# Patient Record
Sex: FEMALE | Race: BLACK OR AFRICAN AMERICAN | Employment: OTHER | URBAN - METROPOLITAN AREA
[De-identification: names, ages, dates, MRNs, and addresses within clinical notes are randomized per-mention and may not be internally consistent; named-entity substitution may affect disease eponyms.]

---

## 2017-01-11 ENCOUNTER — ANESTHESIA EVENT (OUTPATIENT)
Dept: ENDOSCOPY | Age: 72
End: 2017-01-11
Payer: MEDICARE

## 2017-01-11 RX ORDER — METFORMIN HYDROCHLORIDE 500 MG/1
TABLET ORAL 2 TIMES DAILY WITH MEALS
COMMUNITY

## 2017-01-11 RX ORDER — MELOXICAM 15 MG/1
15 TABLET ORAL AS NEEDED
COMMUNITY

## 2017-01-11 RX ORDER — ATORVASTATIN CALCIUM 40 MG/1
40 TABLET, FILM COATED ORAL
COMMUNITY

## 2017-01-11 RX ORDER — LANOLIN ALCOHOL/MO/W.PET/CERES
CREAM (GRAM) TOPICAL
COMMUNITY

## 2017-01-11 RX ORDER — MINERAL OIL
ENEMA (ML) RECTAL
COMMUNITY

## 2017-01-11 RX ORDER — LOSARTAN POTASSIUM 50 MG/1
50 TABLET ORAL DAILY
COMMUNITY

## 2017-01-11 RX ORDER — NIFEDIPINE 60 MG/1
60 TABLET, EXTENDED RELEASE ORAL DAILY
COMMUNITY

## 2017-01-12 ENCOUNTER — HOSPITAL ENCOUNTER (OUTPATIENT)
Age: 72
Setting detail: OUTPATIENT SURGERY
Discharge: HOME | End: 2017-01-12
Attending: INTERNAL MEDICINE | Admitting: INTERNAL MEDICINE
Payer: MEDICARE

## 2017-01-12 ENCOUNTER — ANESTHESIA (OUTPATIENT)
Dept: ENDOSCOPY | Age: 72
End: 2017-01-12
Payer: MEDICARE

## 2017-01-12 VITALS
HEIGHT: 65 IN | HEART RATE: 73 BPM | TEMPERATURE: 96.5 F | RESPIRATION RATE: 18 BRPM | DIASTOLIC BLOOD PRESSURE: 68 MMHG | SYSTOLIC BLOOD PRESSURE: 145 MMHG | BODY MASS INDEX: 28.99 KG/M2 | WEIGHT: 174 LBS | OXYGEN SATURATION: 100 %

## 2017-01-12 LAB — GLUCOSE BLD STRIP.AUTO-MCNC: 112 MG/DL (ref 70–110)

## 2017-01-12 PROCEDURE — 74011250636 HC RX REV CODE- 250/636: Performed by: NURSE ANESTHETIST, CERTIFIED REGISTERED

## 2017-01-12 PROCEDURE — 76040000007: Performed by: INTERNAL MEDICINE

## 2017-01-12 PROCEDURE — 74011000250 HC RX REV CODE- 250: Performed by: NURSE ANESTHETIST, CERTIFIED REGISTERED

## 2017-01-12 PROCEDURE — 82962 GLUCOSE BLOOD TEST: CPT

## 2017-01-12 PROCEDURE — 76060000031 HC ANESTHESIA FIRST 0.5 HR: Performed by: INTERNAL MEDICINE

## 2017-01-12 PROCEDURE — 74011250636 HC RX REV CODE- 250/636

## 2017-01-12 PROCEDURE — 74011000250 HC RX REV CODE- 250

## 2017-01-12 RX ORDER — DEXTROSE 50 % IN WATER (D50W) INTRAVENOUS SYRINGE
25-50 AS NEEDED
Status: DISCONTINUED | OUTPATIENT
Start: 2017-01-12 | End: 2017-01-12 | Stop reason: HOSPADM

## 2017-01-12 RX ORDER — PROPOFOL 10 MG/ML
INJECTION, EMULSION INTRAVENOUS AS NEEDED
Status: DISCONTINUED | OUTPATIENT
Start: 2017-01-12 | End: 2017-01-12 | Stop reason: HOSPADM

## 2017-01-12 RX ORDER — INSULIN LISPRO 100 [IU]/ML
INJECTION, SOLUTION INTRAVENOUS; SUBCUTANEOUS ONCE
Status: DISCONTINUED | OUTPATIENT
Start: 2017-01-12 | End: 2017-01-12 | Stop reason: HOSPADM

## 2017-01-12 RX ORDER — MAGNESIUM SULFATE 100 %
4 CRYSTALS MISCELLANEOUS AS NEEDED
Status: DISCONTINUED | OUTPATIENT
Start: 2017-01-12 | End: 2017-01-12 | Stop reason: HOSPADM

## 2017-01-12 RX ORDER — SODIUM CHLORIDE 0.9 % (FLUSH) 0.9 %
5-10 SYRINGE (ML) INJECTION AS NEEDED
Status: DISCONTINUED | OUTPATIENT
Start: 2017-01-12 | End: 2017-01-12 | Stop reason: HOSPADM

## 2017-01-12 RX ORDER — SODIUM CHLORIDE 0.9 % (FLUSH) 0.9 %
5-10 SYRINGE (ML) INJECTION EVERY 8 HOURS
Status: DISCONTINUED | OUTPATIENT
Start: 2017-01-12 | End: 2017-01-12 | Stop reason: HOSPADM

## 2017-01-12 RX ORDER — SODIUM CHLORIDE, SODIUM LACTATE, POTASSIUM CHLORIDE, CALCIUM CHLORIDE 600; 310; 30; 20 MG/100ML; MG/100ML; MG/100ML; MG/100ML
75 INJECTION, SOLUTION INTRAVENOUS CONTINUOUS
Status: DISCONTINUED | OUTPATIENT
Start: 2017-01-12 | End: 2017-01-12 | Stop reason: HOSPADM

## 2017-01-12 RX ORDER — FAMOTIDINE 10 MG/ML
20 INJECTION INTRAVENOUS ONCE
Status: COMPLETED | OUTPATIENT
Start: 2017-01-12 | End: 2017-01-12

## 2017-01-12 RX ORDER — LIDOCAINE HYDROCHLORIDE 20 MG/ML
INJECTION, SOLUTION EPIDURAL; INFILTRATION; INTRACAUDAL; PERINEURAL AS NEEDED
Status: DISCONTINUED | OUTPATIENT
Start: 2017-01-12 | End: 2017-01-12 | Stop reason: HOSPADM

## 2017-01-12 RX ADMIN — FAMOTIDINE 20 MG: 10 INJECTION, SOLUTION INTRAVENOUS at 08:03

## 2017-01-12 RX ADMIN — PROPOFOL 50 MG: 10 INJECTION, EMULSION INTRAVENOUS at 08:49

## 2017-01-12 RX ADMIN — PROPOFOL 80 MG: 10 INJECTION, EMULSION INTRAVENOUS at 08:43

## 2017-01-12 RX ADMIN — SODIUM CHLORIDE, SODIUM LACTATE, POTASSIUM CHLORIDE, AND CALCIUM CHLORIDE 75 ML/HR: 600; 310; 30; 20 INJECTION, SOLUTION INTRAVENOUS at 08:03

## 2017-01-12 RX ADMIN — LIDOCAINE HYDROCHLORIDE 40 MG: 20 INJECTION, SOLUTION EPIDURAL; INFILTRATION; INTRACAUDAL; PERINEURAL at 08:43

## 2017-01-12 RX ADMIN — PROPOFOL 50 MG: 10 INJECTION, EMULSION INTRAVENOUS at 08:56

## 2017-01-12 RX ADMIN — PROPOFOL 20 MG: 10 INJECTION, EMULSION INTRAVENOUS at 08:46

## 2017-01-12 NOTE — H&P
[email or web address]  [phone]      History and Physical    Patient: Jermain Meadows MRN: [id number]  SSN: [id number]    YOB: 1945  Age: 73 y.o. Sex: female      Subjective:      Jermain Meadows is a 73 y.o. female who presents for increased risk CRCS. Pt has a personal history of colon polyps. Denies symptoms or concerns. .     Past Medical History   Diagnosis Date    Anemia     Arthritis     Diabetes ([address]      Type 2    H/O seasonal allergies     High cholesterol     Hypertension      Past Surgical History   Procedure Laterality Date    Hx hysterectomy       \"approx 30 years ago\"    Hx cyst incision and drainage Right      \"approx 30 years ago\"      History reviewed. No pertinent family history. Social History   Substance Use Topics    Smoking status: Never Smoker    Smokeless tobacco: Not on file    Alcohol use No      Prior to Admission medications    Medication Sig Start Date End Date Taking? Authorizing Provider   metFORMIN (GLUCOPHAGE) 500 mg tablet Take  by mouth two (2) times daily (with meals). Yes Historical Provider   losartan (COZAAR) 50 mg tablet Take 50 mg by mouth daily. Yes Historical Provider   atorvastatin (LIPITOR) 40 mg tablet Take 40 mg by mouth nightly. Yes Historical Provider   NIFEdipine ER (ADALAT CC) 60 mg ER tablet Take 60 mg by mouth daily. Yes Historical Provider   ferrous sulfate 325 mg (65 mg iron) tablet Take  by mouth Daily (before breakfast). Yes Historical Provider   fexofenadine (ALLEGRA) 180 mg tablet Take  by mouth daily as needed for Allergies. Yes Historical Provider   meloxicam (MOBIC) 15 mg tablet Take 15 mg by mouth as needed for Pain. Yes Historical Provider        Allergies   Allergen Reactions    Lisinopril Cough       Review of Systems:  A comprehensive review of systems was negative except for that written in the History of Present Illness.     Objective:     Vitals:    01/11/17 0908   Weight: 78.9 kg (174 lb)   Height: 5' 5\" (1.651 m)        Physical Exam:  GENERAL: alert, cooperative, no distress, appears stated age  LUNG: clear to auscultation bilaterally  HEART: regular rate and rhythm, S1, S2 normal, no murmur, click, rub or gallop  ABDOMEN: soft, non-tender. Bowel sounds normal. No masses,  no organomegaly  NEUROLOGIC: alert & oriented x 3    Assessment:     1. History of colon polyps    Plan:     1. Colonoscopy    Signed By: Dwayne Beck MD     January 12, 2017      Dwayne Beck MD  [facility] [address] [phone]  [email or web address]

## 2017-01-12 NOTE — DISCHARGE INSTRUCTIONS
Colonoscopy: What to Expect at [address] [place] [address]  After you have a colonoscopy, you will stay at the clinic for 1 to 2 hours until the medicines wear off. Then you can go home. But you will need to arrange for a ride. Your doctor will tell you when you can eat and do your other usual activities. Your doctor will talk to you about when you will need your next colonoscopy. Your doctor can help you decide how often you need to be checked. This will depend on the results of your test and your risk for colorectal cancer. After the test, you may be bloated or have gas pains. You may need to pass gas. If a biopsy was done or a polyp was removed, you may have streaks of blood in your stool (feces) for a few days. This care sheet gives you a general idea about how long it will take for you to recover. But each person recovers at a different pace. Follow the steps below to get better as quickly as possible. How can you care for yourself at home? Activity  · Rest when you feel tired. · You can do your normal activities when it feels okay to do so. Diet  · Follow your doctor's directions for eating. · Unless your doctor has told you not to, drink plenty of fluids. This helps to replace the fluids that were lost during the colon prep. · Do not drink alcohol. Medicines  · If polyps were removed or a biopsy was done during the test, your doctor may tell you not to take aspirin or other anti-inflammatory medicines for a few days. These include ibuprofen (Advil, Motrin) and naproxen (Aleve). Other instructions  · For your safety, do not drive or operate machinery until the medicine wears off and you can think clearly. Your doctor may tell you not to drive or operate machinery until the day after your test.  · Do not sign legal documents or make major decisions until the medicine wears off and you can think clearly. The anesthesia can make it hard for you to fully understand what you are agreeing to.   Follow-up care is a key part of your treatment and safety. Be sure to make and go to all appointments, and call your doctor if you are having problems. It's also a good idea to know your test results and keep a list of the medicines you take. When should you call for help? Call 911 anytime you think you may need emergency care. For example, call if:  · You passed out (lost consciousness). · You pass maroon or bloody stools. · You have severe belly pain. Call your doctor now or seek immediate medical care if:  · Your stools are black and tarlike. · Your stools have streaks of blood, but you did not have a biopsy or any polyps removed. · You have belly pain, or your belly is swollen and firm. · You vomit. · You have a fever. · You are very dizzy. Watch closely for changes in your health, and be sure to contact your doctor if you have any problems. Where can you learn more? Go to [employer]  Enter E264 in the search box to learn more about \"Colonoscopy: What to Expect at Home. \"   © 2866-3367 [employer]. Care instructions adapted under license by [facility] (which disclaims liability or warranty for this information). This care instruction is for use with your licensed healthcare professional. If you have questions about a medical condition or this instruction, always ask your healthcare professional. [place] [address] any warranty or liability for your use of this information. Content Version: 49.3.161988; Current as of: November 14, 2014      DISCHARGE SUMMARY from Nurse     POST-PROCEDURE INSTRUCTIONS:    Call your Physician if you:  ? Observe any excess bleeding. ? Develop a temperature over 100.5o F.  ? Experience abdominal, shoulder or chest pain. ? Notice any signs of decreased circulation or nerve impairment to an extremity such as a change in color, persistent numbness, tingling, coldness or increase in pain. ?  Vomit blood or you have nausea and vomiting lasting longer than 4 hours. ? Are unable to take medications. ? Are unable to urinate within 8 hours after discharge following general anesthesia or intravenous sedation. For the next 24 hours after receiving general anesthesia or intravenous sedation, or while taking prescription Narcotics, limit your activities:  ? Do NOT drive a motor vehicle, operate hazard machinery or power tools, or perform tasks that require coordination. The medication you received during your procedure may have some effect on your mental awareness. ? Do NOT make important personal or business decisions. The medication you received during your procedure may have some effect on your mental awareness. ? Do NOT drink alcoholic beverages. These drinks do not mix well with the medications that have been given to you. ? Upon discharge from the hospital, you must be accompanied by a responsible adult. ? Resume your diet as directed by your physician. ? Resume medications as your physician has prescribed. ? Please give a list of your current medications to your Primary Care Provider. ? Please update this list whenever your medications are discontinued, doses are changed, or new medications (including over-the-counter products) are added. ? Please carry medication information at all times in case of emergency situations. These are general instructions for a healthy lifestyle:    No smoking/ No tobacco products/ Avoid exposure to second hand smoke.  Surgeon General's Warning:  Quitting smoking now greatly reduces serious risk to your health. Obesity, smoking, and a sedentary lifestyle greatly increase your risk for illness.    A healthy diet, regular physical exercise & weight monitoring are important for maintaining a healthy lifestyle   You may be retaining fluid if you have a history of heart failure or if you experience any of the following symptoms:  Weight gain of 3 pounds or more overnight or 5 pounds in a week, increased swelling in our hands or feet or shortness of breath while lying flat in bed. Please call your doctor as soon as you notice any of these symptoms; do not wait until your next office visit. Recognize signs and symptoms of STROKE:  F  -  Face looks uneven  A  -  Arms unable to move or move unevenly  S  -  Speech slurred or non-existent  T  -  Time to call 911 - as soon as signs and symptoms begin - DO NOT go back to bed or wait to see If you get better - TIME IS BRAIN. Colorectal Screening   Colorectal cancer almost always develops from precancerous polyps (abnormal growths) in the colon or rectum. Screening tests can find precancerous polyps, so that they can be removed before they turn into cancer. Screening tests can also find colorectal cancer early, when treatment works best.  Regan Lacey Speak with your physician about when you should begin screening and how often you should be tested. Additional Information       Diverticulosis: Care Instructions  Your Care Instructions  In diverticulosis, pouches called diverticula form in the wall of the large intestine (colon). The pouches do not cause any pain or other symptoms. Most people who have diverticulosis do not know they have it. But the pouches sometimes bleed, and if they become infected, they can cause pain and other symptoms. When this happens, it is called diverticulitis. Diverticula form when pressure pushes the wall of the colon outward at certain weak points. A diet that is too low in fiber can cause diverticula. Follow-up care is a key part of your treatment and safety. Be sure to make and go to all appointments, and call your doctor if you are having problems. It's also a good idea to know your test results and keep a list of the medicines you take. How can you care for yourself at home? · Include fruits, leafy green vegetables, beans, and whole grains in your diet each day. These foods are high in fiber.   · Take a fiber supplement, such as Citrucel or Metamucil, every day if needed. Read and follow all instructions on the label. · Drink plenty of fluids, enough so that your urine is light yellow or clear like water. If you have kidney, heart, or liver disease and have to limit fluids, talk with your doctor before you increase the amount of fluids you drink. · Get at least 30 minutes of exercise on most days of the week. Walking is a good choice. You also may want to do other activities, such as running, swimming, cycling, or playing tennis or team sports. · Cut out foods that cause gas, pain, or other symptoms. When should you call for help? Call your doctor now or seek immediate medical care if:  · You have belly pain. · You pass maroon or very bloody stools. · You have a fever. · You have nausea and vomiting. · You have unusual changes in your bowel movements or abdominal swelling. · You have burning pain when you urinate. · You have abnormal vaginal discharge. · You have shoulder pain. · You have cramping pain that does not get better when you have a bowel movement or pass gas. · You pass gas or stool from your urethra while urinating. Watch closely for changes in your health, and be sure to contact your doctor if you have any problems. Where can you learn more? Go to http://zoraida-oscar.info/. Enter G742 in the search box to learn more about \"Diverticulosis: Care Instructions. \"  Current as of: August 9, 2016  Content Version: 11.1  © 6767-1799 [employer]. Care instructions adapted under license by [employer] (which disclaims liability or warranty for this information). If you have questions about a medical condition or this instruction, always ask your healthcare professional. [address] any warranty or liability for your use of this information. If you have questions, please call [phone]. Remember, MyChart is NOT to be used for urgent needs. For medical emergencies, dial 911. Educational references and/or instructions provided during this visit included:    colonoscopy discharge      APPOINTMENTS:    Please make a follow-up appointment with your physician. Discharge information has been reviewed with the patient. The patient verbalized understanding.

## 2017-01-12 NOTE — ANESTHESIA PREPROCEDURE EVALUATION
Anesthetic History               Review of Systems / Medical History  Patient summary reviewed, nursing notes reviewed and pertinent labs reviewed    Pulmonary                   Neuro/Psych              Cardiovascular    Hypertension: poorly controlled                   GI/Hepatic/Renal                Endo/Other    Diabetes: well controlled, type 2    Arthritis     Other Findings   Comments: Current Smoker? NO       Elective Surgery? Yes       Abstained from smoking 24 hours prior to anesthesia? N/A    Risk Factors for Postoperative nausea/vomiting:       History of postoperative nausea/vomiting? NO       Female? YES       Motion sickness? NO       Intended opioid administration for postoperative analgesia?   NO         Physical Exam    Airway  Mallampati: III  TM Distance: > 6 cm  Neck ROM: normal range of motion   Mouth opening: Normal     Cardiovascular    Rhythm: regular  Rate: normal         Dental    Dentition: Bridges and Poor dentition     Pulmonary  Breath sounds clear to auscultation               Abdominal  GI exam deferred       Other Findings            Anesthetic Plan    ASA: 3  Anesthesia type: MAC          Induction: Intravenous  Anesthetic plan and risks discussed with: Patient

## 2017-01-12 NOTE — PROCEDURES
[email or web address]  [phone]        Brief Procedure Note    Shahnaz Hoffman  [birth year]  [id number]    Date of Procedure: 1/12/2017    Preoperative diagnosis: Z12.11,  Colon cancer Screening    Postoperative diagnosis: Diverticulosis. internal hemorrhoids    Description of Findings: same    Sedation/Anesthesia: Monitored Anesthesia Care; See Anesthesia Note    Procedure: Procedure(s):  COLONOSCOPY    :  Dr. Lani Bautista MD    Assistant(s): Endoscopy Technician-1: Machelle Fernandes  Endoscopy RN-1: Vishal Miller RN    EBL:None    Specimens: * No specimens in log *    Findings: See printed and scanned procedure note    Complications: None    Dr. Lani Bautista MD  1/12/2017  9:14 AM    Lani Bautista MD  [facility] [address] [phone]  [email or web address]. [facility]

## 2017-01-12 NOTE — ANESTHESIA POSTPROCEDURE EVALUATION
Post-Anesthesia Evaluation and Assessment    Patient: Merly Flank MRN: [id number]  SSN: [id number]    YOB: 1945  Age: 73 y.o. Sex: female       Cardiovascular Function/Vital Signs  Visit Vitals    /68    Pulse 73    Temp 35.8 °C (96.5 °F)    Resp 18    Ht 5' 5\" (1.651 m)    Wt 78.9 kg (174 lb)    SpO2 100%    BMI 28.96 kg/m2       Patient is status post MAC anesthesia for Procedure(s):  COLONOSCOPY. Nausea/Vomiting: None    Postoperative hydration reviewed and adequate. Pain:  Pain Scale 1: Numeric (0 - 10) (01/12/17 0929)  Pain Intensity 1: 0 (01/12/17 0929)   Managed    Neurological Status: At baseline    Mental Status and Level of Consciousness: Arousable    Pulmonary Status:   O2 Device: Room air (01/12/17 0929)   Adequate oxygenation and airway patent    Complications related to anesthesia: None    Post-anesthesia assessment completed.  No concerns    Signed By: Mavis Tucker MD     January 12, 2017

## 2017-01-12 NOTE — IP AVS SNAPSHOT
[address] [address] [address] [address] [place] [id number] [phone] Patient: Armen Gonzalez MRN: [id number] MCT:5/58/6619 You are allergic to the following Allergen Reactions Lisinopril Cough Tape (Adhesive) Rash Itching at site Recent Documentation Height Weight BMI OB Status Smoking Status 1.651 m 78.9 kg 28.96 kg/m2 Hysterectomy Never Smoker Emergency Contacts Name Discharge Info Relation Home Work Mobile Kelly Hansen CAREGIVER [3] Spouse [3] [phone] About your hospitalization You were admitted on:  January 12, 2017 You last received care in the:  [facility] You were discharged on:  January 12, 2017 Unit phone number:  [phone] Why you were hospitalized Your primary diagnosis was:  Not on File Providers Seen During Your Hospitalizations Provider Role Specialty Primary office phone Maegan Zhang MD Attending Provider Gastroenterology [phone] Your Primary Care Physician (PCP) Primary Care Physician Office Phone Office Fax Kristal Huang [phone] Follow-up Information Follow up With Details Comments Contact Info Aniket Juan MD   [place] [address] 
[phone] Maegan Zhang MD   [address] [place] [address] [address] [facility] 
[phone] Current Discharge Medication List  
  
CONTINUE these medications which have NOT CHANGED Dose & Instructions Dispensing Information Comments Morning Noon Evening Bedtime  
 atorvastatin 40 mg tablet Commonly known as:  LIPITOR Your next dose is: Today, Tomorrow Other:  _________ Dose:  40 mg Take 40 mg by mouth nightly. Refills:  0  
     
   
   
   
  
 ferrous sulfate 325 mg (65 mg iron) tablet Your next dose is: Today, Tomorrow Other:  _________ Take  by mouth Daily (before breakfast). Refills:  0  
     
   
   
   
  
 fexofenadine 180 mg tablet Commonly known as:  Tamar Brenda Your next dose is: Today, Tomorrow Other:  _________ Take  by mouth daily as needed for Allergies. Refills:  0  
     
   
   
   
  
 losartan 50 mg tablet Commonly known as:  COZAAR Your next dose is: Today, Tomorrow Other:  _________ Dose:  50 mg Take 50 mg by mouth daily. Refills:  0  
     
   
   
   
  
 meloxicam 15 mg tablet Commonly known as:  MOBIC Your next dose is: Today, Tomorrow Other:  _________ Dose:  15 mg Take 15 mg by mouth as needed for Pain. Refills:  0  
     
   
   
   
  
 metFORMIN 500 mg tablet Commonly known as:  GLUCOPHAGE Your next dose is: Today, Tomorrow Other:  _________ Take  by mouth two (2) times daily (with meals). Refills:  0  
     
   
   
   
  
 NIFEdipine ER 60 mg ER tablet Commonly known as:  ADALAT CC Your next dose is: Today, Tomorrow Other:  _________ Dose:  60 mg Take 60 mg by mouth daily. Refills:  0 Discharge Instructions Colonoscopy: What to Expect at [facility] Your Recovery After you have a colonoscopy, you will stay at the clinic for 1 to 2 hours until the medicines wear off. Then you can go home. But you will need to arrange for a ride. Your doctor will tell you when you can eat and do your other usual activities. Your doctor will talk to you about when you will need your next colonoscopy. Your doctor can help you decide how often you need to be checked. This will depend on the results of your test and your risk for colorectal cancer. After the test, you may be bloated or have gas pains.  You may need to pass gas. If a biopsy was done or a polyp was removed, you may have streaks of blood in your stool (feces) for a few days. This care sheet gives you a general idea about how long it will take for you to recover. But each person recovers at a different pace. Follow the steps below to get better as quickly as possible. How can you care for yourself at home? Activity · Rest when you feel tired. · You can do your normal activities when it feels okay to do so. Diet · Follow your doctor's directions for eating. · Unless your doctor has told you not to, drink plenty of fluids. This helps to replace the fluids that were lost during the colon prep. · Do not drink alcohol. Medicines · If polyps were removed or a biopsy was done during the test, your doctor may tell you not to take aspirin or other anti-inflammatory medicines for a few days. These include ibuprofen (Advil, Motrin) and naproxen (Aleve). Other instructions · For your safety, do not drive or operate machinery until the medicine wears off and you can think clearly. Your doctor may tell you not to drive or operate machinery until the day after your test. 
· Do not sign legal documents or make major decisions until the medicine wears off and you can think clearly. The anesthesia can make it hard for you to fully understand what you are agreeing to. Follow-up care is a key part of your treatment and safety. Be sure to make and go to all appointments, and call your doctor if you are having problems. It's also a good idea to know your test results and keep a list of the medicines you take. When should you call for help? Call 911 anytime you think you may need emergency care. For example, call if: 
· You passed out (lost consciousness). · You pass maroon or bloody stools. · You have severe belly pain. Call your doctor now or seek immediate medical care if: 
· Your stools are black and tarlike. · Your stools have streaks of blood, but you did not have a biopsy or any polyps removed. · You have belly pain, or your belly is swollen and firm. · You vomit. · You have a fever. · You are very dizzy. Watch closely for changes in your health, and be sure to contact your doctor if you have any problems. Where can you learn more? Go to Tamr.be Enter E264 in the search box to learn more about \"Colonoscopy: What to Expect at Home. \"  
© 8095-2978 Healthwise, Incorporated. Care instructions adapted under license by [employer] (which disclaims liability or warranty for this information). This care instruction is for use with your licensed healthcare professional. If you have questions about a medical condition or this instruction, always ask your healthcare professional. [address] any warranty or liability for your use of this information. Content Version: 17.6.267002; Current as of: November 14, 2014 DISCHARGE SUMMARY from Nurse POST-PROCEDURE INSTRUCTIONS: 
 
Call your Physician if you: 
? Observe any excess bleeding. ? Develop a temperature over 100.5o F. 
? Experience abdominal, shoulder or chest pain. ? Notice any signs of decreased circulation or nerve impairment to an extremity such as a change in color, persistent numbness, tingling, coldness or increase in pain. ? Vomit blood or you have nausea and vomiting lasting longer than 4 hours. ? Are unable to take medications. ? Are unable to urinate within 8 hours after discharge following general anesthesia or intravenous sedation. For the next 24 hours after receiving general anesthesia or intravenous sedation, or while taking prescription Narcotics, limit your activities: 
? Do NOT drive a motor vehicle, operate hazard machinery or power tools, or perform tasks that require coordination. The medication you received during your procedure may have some effect on your mental awareness. ? Do NOT make important personal or business decisions. The medication you received during your procedure may have some effect on your mental awareness. ? Do NOT drink alcoholic beverages. These drinks do not mix well with the medications that have been given to you. ? Upon discharge from the hospital, you must be accompanied by a responsible adult. ? Resume your diet as directed by your physician. ? Resume medications as your physician has prescribed. ? Please give a list of your current medications to your Primary Care Provider. ? Please update this list whenever your medications are discontinued, doses are changed, or new medications (including over-the-counter products) are added. ? Please carry medication information at all times in case of emergency situations. These are general instructions for a healthy lifestyle: No smoking/ No tobacco products/ Avoid exposure to second hand smoke. ? Surgeon General's Warning:  Quitting smoking now greatly reduces serious risk to your health. Obesity, smoking, and a sedentary lifestyle greatly increase your risk for illness. ? A healthy diet, regular physical exercise & weight monitoring are important for maintaining a healthy lifestyle ? You may be retaining fluid if you have a history of heart failure or if you experience any of the following symptoms:  Weight gain of 3 pounds or more overnight or 5 pounds in a week, increased swelling in our hands or feet or shortness of breath while lying flat in bed. Please call your doctor as soon as you notice any of these symptoms; do not wait until your next office visit. Recognize signs and symptoms of STROKE: 
F  -  Face looks uneven A  -  Arms unable to move or move unevenly S  -  Speech slurred or non-existent T  -  Time to call 911 - as soon as signs and symptoms begin - DO NOT go back to bed or wait to see If you get better - TIME IS BRAIN. Colorectal Screening ? Colorectal cancer almost always develops from precancerous polyps (abnormal growths) in the colon or rectum. Screening tests can find precancerous polyps, so that they can be removed before they turn into cancer. Screening tests can also find colorectal cancer early, when treatment works best. 
? Speak with your physician about when you should begin screening and how often you should be tested. Additional Information Diverticulosis: Care Instructions Your Care Instructions In diverticulosis, pouches called diverticula form in the wall of the large intestine (colon). The pouches do not cause any pain or other symptoms. Most people who have diverticulosis do not know they have it. But the pouches sometimes bleed, and if they become infected, they can cause pain and other symptoms. When this happens, it is called diverticulitis. Diverticula form when pressure pushes the wall of the colon outward at certain weak points. A diet that is too low in fiber can cause diverticula. Follow-up care is a key part of your treatment and safety. Be sure to make and go to all appointments, and call your doctor if you are having problems. It's also a good idea to know your test results and keep a list of the medicines you take. How can you care for yourself at home? · Include fruits, leafy green vegetables, beans, and whole grains in your diet each day. These foods are high in fiber. · Take a fiber supplement, such as Citrucel or Metamucil, every day if needed. Read and follow all instructions on the label. · Drink plenty of fluids, enough so that your urine is light yellow or clear like water. If you have kidney, heart, or liver disease and have to limit fluids, talk with your doctor before you increase the amount of fluids you drink. · Get at least 30 minutes of exercise on most days of the week. Walking is a good choice.  You also may want to do other activities, such as running, swimming, cycling, or playing tennis or team sports. · Cut out foods that cause gas, pain, or other symptoms. When should you call for help? Call your doctor now or seek immediate medical care if: 
· You have belly pain. · You pass maroon or very bloody stools. · You have a fever. · You have nausea and vomiting. · You have unusual changes in your bowel movements or abdominal swelling. · You have burning pain when you urinate. · You have abnormal vaginal discharge. · You have shoulder pain. · You have cramping pain that does not get better when you have a bowel movement or pass gas. · You pass gas or stool from your urethra while urinating. Watch closely for changes in your health, and be sure to contact your doctor if you have any problems. Where can you learn more? Go to http://zoraida-oscar.info/. Enter W869 in the search box to learn more about \"Diverticulosis: Care Instructions. \" Current as of: August 9, 2016 Content Version: 11.1 © 4934-4591 Yahoo!. Care instructions adapted under license by [employer] (which disclaims liability or warranty for this information). If you have questions about a medical condition or this instruction, always ask your healthcare professional. [place] [address] any warranty or liability for your use of this information. If you have questions, please call [phone]. Remember, Cryptopay is NOT to be used for urgent needs. For medical emergencies, dial 911. Educational references and/or instructions provided during this visit included: 
 
{[facility] GI DOC TITLES:39381} APPOINTMENTS: 
 
{[facility] GI APPTS:17035} Discharge information has been reviewed with the {PATIENT PARENT GUARDIAN:34086}. The {PATIENT PARENT GUARDIAN:57895} verbalized understanding. Discharge Orders None Introducing [facility] [place] & [facility] SERVICES!    
 [facility] introduces Cryptopay patient portal. Now you can access parts of your medical record, email your doctor's office, and request medication refills online. 1. In your internet browser, go to [email or web address] 2. Click on the First Time User? Click Here link in the Sign In box. You will see the New Member Sign Up page. 3. Enter your Kenzei Access Code exactly as it appears below. You will not need to use this code after youve completed the sign-up process. If you do not sign up before the expiration date, you must request a new code. · Kenzei Access Code: [email or web address] Expires: 2/5/2017 12:00 PM 
 
4. Enter the last four digits of your Social Security Number (xxxx) and Date of Birth (mm/dd/yyyy) as indicated and click Submit. You will be taken to the next sign-up page. 5. Create a Kenzei ID. This will be your Kenzei login ID and cannot be changed, so think of one that is secure and easy to remember. 6. Create a Kenzei password. You can change your password at any time. 7. Enter your Password Reset Question and Answer. This can be used at a later time if you forget your password. 8. Enter your e-mail address. You will receive e-mail notification when new information is available in [address] 9. Click Sign Up. You can now view and download portions of your medical record. 10. Click the Download Summary menu link to download a portable copy of your medical information. If you have questions, please visit the Frequently Asked Questions section of the Kenzei website. Remember, Kenzei is NOT to be used for urgent needs. For medical emergencies, dial 911. Now available from your iPhone and Android! General Information Please provide this summary of care documentation to your next provider. Patient Signature:  ____________________________________________________________ Date:  ____________________________________________________________  
  
Earnstine Jesus  Provider Signature: ____________________________________________________________ Date:  ____________________________________________________________

## 2017-07-07 ENCOUNTER — HOSPITAL ENCOUNTER (OUTPATIENT)
Dept: LAB | Age: 72
Discharge: HOME | End: 2017-07-07
Payer: MEDICARE

## 2017-07-07 ENCOUNTER — HOSPITAL ENCOUNTER (OUTPATIENT)
Dept: ONCOLOGY | Age: 72
Discharge: HOME | End: 2017-07-07

## 2017-07-07 ENCOUNTER — OFFICE VISIT (OUTPATIENT)
Dept: ONCOLOGY | Age: 72
End: 2017-07-07

## 2017-07-07 VITALS
HEIGHT: 65 IN | SYSTOLIC BLOOD PRESSURE: 154 MMHG | WEIGHT: 169.8 LBS | DIASTOLIC BLOOD PRESSURE: 70 MMHG | BODY MASS INDEX: 28.29 KG/M2 | TEMPERATURE: 98.2 F | HEART RATE: 75 BPM

## 2017-07-07 DIAGNOSIS — D75.839 THROMBOCYTOSIS: Primary | ICD-10-CM

## 2017-07-07 DIAGNOSIS — D50.8 IRON DEFICIENCY ANEMIA SECONDARY TO INADEQUATE DIETARY IRON INTAKE: ICD-10-CM

## 2017-07-07 DIAGNOSIS — D75.839 THROMBOCYTOSIS: ICD-10-CM

## 2017-07-07 LAB
ALBUMIN SERPL BCP-MCNC: 4.3 G/DL (ref 3.4–5)
ALBUMIN/GLOB SERPL: 1.4 {RATIO} (ref 0.8–1.7)
ALP SERPL-CCNC: 95 U/L (ref 45–117)
ALT SERPL-CCNC: 26 U/L (ref 13–56)
ANION GAP BLD CALC-SCNC: 6 MMOL/L (ref 3–18)
AST SERPL W P-5'-P-CCNC: 18 U/L (ref 15–37)
BASO+EOS+MONOS # BLD AUTO: 0.6 K/UL (ref 0–2.3)
BASO+EOS+MONOS # BLD AUTO: 13 % (ref 0.1–17)
BILIRUB SERPL-MCNC: 0.4 MG/DL (ref 0.2–1)
BUN SERPL-MCNC: 12 MG/DL (ref 7–18)
BUN/CREAT SERPL: 11 (ref 12–20)
CALCIUM SERPL-MCNC: 9.8 MG/DL (ref 8.5–10.1)
CHLORIDE SERPL-SCNC: 105 MMOL/L (ref 100–108)
CO2 SERPL-SCNC: 29 MMOL/L (ref 21–32)
CREAT SERPL-MCNC: 1.1 MG/DL (ref 0.6–1.3)
DIFFERENTIAL METHOD BLD: ABNORMAL
ERYTHROCYTE [DISTWIDTH] IN BLOOD BY AUTOMATED COUNT: 13.8 % (ref 11.5–14.5)
FERRITIN SERPL-MCNC: 58 NG/ML (ref 8–388)
GLOBULIN SER CALC-MCNC: 3.1 G/DL (ref 2–4)
GLUCOSE SERPL-MCNC: 109 MG/DL (ref 74–99)
HCT VFR BLD AUTO: 38.1 % (ref 36–48)
HGB BLD-MCNC: 11.8 G/DL (ref 12–16)
IRON SATN MFR SERPL: 26 %
IRON SERPL-MCNC: 86 UG/DL (ref 50–175)
LYMPHOCYTES # BLD AUTO: 44 % (ref 14–44)
LYMPHOCYTES # BLD: 1.9 K/UL (ref 1.1–5.9)
MCH RBC QN AUTO: 25.2 PG (ref 25–35)
MCHC RBC AUTO-ENTMCNC: 31 G/DL (ref 31–37)
MCV RBC AUTO: 81.2 FL (ref 78–102)
NEUTS SEG # BLD: 1.9 K/UL (ref 1.8–9.5)
NEUTS SEG NFR BLD AUTO: 44 % (ref 40–70)
PLATELET # BLD AUTO: 424 K/UL (ref 140–440)
POTASSIUM SERPL-SCNC: 4.1 MMOL/L (ref 3.5–5.5)
PROT SERPL-MCNC: 7.4 G/DL (ref 6.4–8.2)
RBC # BLD AUTO: 4.69 M/UL (ref 4.1–5.1)
SODIUM SERPL-SCNC: 140 MMOL/L (ref 136–145)
TIBC SERPL-MCNC: 331 UG/DL (ref 250–450)
WBC # BLD AUTO: 4.4 K/UL (ref 4.5–13)

## 2017-07-07 PROCEDURE — 36415 COLL VENOUS BLD VENIPUNCTURE: CPT | Performed by: INTERNAL MEDICINE

## 2017-07-07 PROCEDURE — 80053 COMPREHEN METABOLIC PANEL: CPT | Performed by: INTERNAL MEDICINE

## 2017-07-07 PROCEDURE — 81270 JAK2 GENE: CPT | Performed by: INTERNAL MEDICINE

## 2017-07-07 PROCEDURE — 83540 ASSAY OF IRON: CPT | Performed by: INTERNAL MEDICINE

## 2017-07-07 PROCEDURE — 84165 PROTEIN E-PHORESIS SERUM: CPT | Performed by: INTERNAL MEDICINE

## 2017-07-07 PROCEDURE — 82728 ASSAY OF FERRITIN: CPT | Performed by: INTERNAL MEDICINE

## 2017-07-07 NOTE — PATIENT INSTRUCTIONS
Iron Deficiency Anemia: Care Instructions  Your Care Instructions    Anemia means that you do not have enough red blood cells. Red blood cells carry oxygen around your body. When you have anemia, it can make you pale, weak, and tired. Many things can cause anemia. The most common cause is loss of blood. This can happen if you have heavy menstrual periods. It can also happen if you have bleeding in your stomach or bowel. You can also get anemia if you don't have enough iron in your diet or if it's hard for your body to absorb iron. In some cases, pregnancy causes anemia. That's because a pregnant woman needs more iron. Your doctor may do more tests to find the cause of your anemia. If a disease or other health problem is causing it, your doctor will treat that problem. It's important to follow up with your doctor to make sure that your iron level returns to normal.  Follow-up care is a key part of your treatment and safety. Be sure to make and go to all appointments, and call your doctor if you are having problems. It's also a good idea to know your test results and keep a list of the medicines you take. How can you care for yourself at home? · If your doctor recommended iron pills, take them as directed. ¨ Try to take the pills on an empty stomach. You can do this about 1 hour before or 2 hours after meals. But you may need to take iron with food to avoid an upset stomach. ¨ Do not take antacids or drink milk or anything with caffeine within 2 hours of when you take your iron. They can keep your body from absorbing the iron well. ¨ Vitamin C helps your body absorb iron. You may want to take iron pills with a glass of orange juice or some other food high in vitamin C.  ¨ Iron pills may cause stomach problems. These include heartburn, nausea, diarrhea, constipation, and cramps. It can help to drink plenty of fluids and include fruits, vegetables, and fiber in your diet.   ¨ It's normal for iron pills to make your stool a greenish or grayish black. But internal bleeding can also cause dark stool. So it's important to tell your doctor about any color changes. ¨ Call your doctor if you think you are having a problem with your iron pills. Even after you start to feel better, it will take several months for your body to build up its supply of iron. ¨ If you miss a pill, don't take a double dose. ¨ Keep iron pills out of the reach of small children. Too much iron can be very dangerous. · Eat foods with a lot of iron. These include red meat, shellfish, poultry, and eggs. They also include beans, raisins, whole-grain bread, and leafy green vegetables. · Steam your vegetables. This is the best way to prepare them if you want to get as much iron as possible. · Be safe with medicines. Do not take nonsteroidal anti-inflammatory pain relievers unless your doctor tells you to. These include aspirin, naproxen (Aleve), and ibuprofen (Advil, Motrin). · Liquid iron can stain your teeth. But you can mix it with water or juice and drink it with a straw. Then it won't get on your teeth. When should you call for help? Call 911 anytime you think you may need emergency care. For example, call if:  · You passed out (lost consciousness). · You vomit blood or what looks like coffee grounds. · You pass maroon or very bloody stools. Call your doctor now or seek immediate medical care if:  · Your stools are black and look like tar, or they have streaks of blood. · You are dizzy or lightheaded, or you feel like you may faint. Watch closely for changes in your health, and be sure to contact your doctor if:  · Your fatigue and weakness continue or get worse. · You have side effects from taking iron pills, such as nausea, vomiting, constipation, diarrhea, or heartburn. · You do not get better as expected. Where can you learn more? Go to http://brian.info/.   Enter R962 in the search box to learn more about \"Iron Deficiency Anemia: Care Instructions. \"  Current as of: October 13, 2016  Content Version: 11.3  © 2622-2435 allyve. Care instructions adapted under license by [employer] (which disclaims liability or warranty for this information). If you have questions about a medical condition or this instruction, always ask your healthcare professional. [address] any warranty or liability for your use of this information. Complete Blood Count (CBC): About This Test  What is it? A complete blood count (CBC) is a blood test that gives important information about your blood cells, especially red blood cells, white blood cells, and platelets. Why is this test done? A CBC may be done as part of a regular physical exam. There are many other reasons that a doctor may want this blood test, including to:  · Find the cause of symptoms such as fatigue, weakness, fever, bruising, or weight loss. · Find anemia or an infection. · See how much blood has been lost if there is bleeding. · Diagnose diseases of the blood, such as leukemia or polycythemia. How can you prepare for the test?  You do not need to do anything before having this test.  What happens during the test?  The health professional taking a sample of your blood will:  · Wrap an elastic band around your upper arm. This makes the veins below the band larger so it is easier to put a needle into the vein. · Clean the needle site with alcohol. · Put the needle into the vein. · Attach a tube to the needle to fill it with blood. · Remove the band from your arm when enough blood is collected. · Put a gauze pad or cotton ball over the needle site as the needle is removed. · Put pressure on the site and then put on a bandage. If this blood test is done on a baby, a heel stick may be done instead of a blood draw from a vein.   What happens after the test?  · You will probably be able to go home right away.  · You can go back to your usual activities right away. Follow-up care is a key part of your treatment and safety. Be sure to make and go to all appointments, and call your doctor if you are having problems. It's also a good idea to keep a list of the medicines you take. Ask your doctor when you can expect to have your test results. Where can you learn more? Go to http://zoraida-oscar.info/. Enter A193 in the search box to learn more about \"Complete Blood Count (CBC): About This Test.\"  Current as of: October 14, 2016  Content Version: 11.3  © 4165-3693 [employer], [email or web address]. Care instructions adapted under license by [employer] (which disclaims liability or warranty for this information). If you have questions about a medical condition or this instruction, always ask your healthcare professional. [facility] [address] any warranty or liability for your use of this information.

## 2017-07-07 NOTE — MR AVS SNAPSHOT
Visit Information Date & Time Provider Department Dept. Phone Encounter #  
 7/7/2017  9:30 AM Jocelyn Coleman MD [facility] Office [phone] [id number] Follow-up Instructions Return in about 2 weeks (around 7/21/2017). Your Appointments 7/21/2017  1:45 PM  
Office Visit with Jocelyn Coleman MD  
[address] [facility]) Appt Note: 2 wk fu  
 [facility] [address] [place] [address]  
[phone]  
  
   
 [facility] [address] Upcoming Health Maintenance Date Due Hepatitis C Screening 1945 DTaP/Tdap/Td series (1 - Tdap) 8/17/1968 BREAST CANCER SCRN MAMMOGRAM 8/17/1997 FOBT Q 1 YEAR AGE 50-75 8/17/1997 ZOSTER VACCINE AGE 60> 8/17/2007 GLAUCOMA SCREENING Q2Y 8/17/2012 Pneumococcal 65+ Low/Medium Risk (1 of 2 - PCV13) 8/17/2012 MEDICARE YEARLY EXAM 8/17/2012 INFLUENZA AGE 9 TO ADULT 8/1/2017 Allergies as of 7/7/2017  Review Complete On: 1/12/2017 By: Sixto Gutierrez RN Severity Noted Reaction Type Reactions Lisinopril  01/11/2017    Cough Tape [Adhesive] Low 01/12/2017   Intolerance Rash Itching at site Current Immunizations  Never Reviewed No immunizations on file. Not reviewed this visit You Were Diagnosed With   
  
 Codes Comments Thrombocytosis ([facility]    -  Primary ICD-10-CM: D47.3 ICD-9-CM: 238.71 Iron deficiency anemia secondary to inadequate dietary iron intake     ICD-10-CM: D50.8 ICD-9-CM: 280.1 Vitals BP Pulse Temp Height(growth percentile) Weight(growth percentile) BMI  
 154/70 75 98.2 °F (36.8 °C) 5' 5\" (1.651 m) 169 lb 12.8 oz (77 kg) 28.26 kg/m2 OB Status Smoking Status Hysterectomy Never Smoker BMI and BSA Data Body Mass Index Body Surface Area  
 28.26 kg/m 2 1.88 m 2 Your Updated Medication List  
  
   
 This list is accurate as of: 7/7/17 10:30 AM.  Always use your most recent med list.  
  
  
  
  
 atorvastatin 40 mg tablet Commonly known as:  LIPITOR Take 40 mg by mouth nightly. ferrous sulfate 325 mg (65 mg iron) tablet Take  by mouth Daily (before breakfast). fexofenadine 180 mg tablet Commonly known as:  Bailey Lapidus Take  by mouth daily as needed for Allergies. losartan 50 mg tablet Commonly known as:  COZAAR Take 50 mg by mouth daily. meloxicam 15 mg tablet Commonly known as:  MOBIC Take 15 mg by mouth as needed for Pain.  
  
 metFORMIN 500 mg tablet Commonly known as:  GLUCOPHAGE Take  by mouth two (2) times daily (with meals). NIFEdipine ER 60 mg ER tablet Commonly known as:  ADALAT CC Take 60 mg by mouth daily. We Performed the Following COMPLETE CBC & AUTO DIFF WBC [57703 CPT(R)] Follow-up Instructions Return in about 2 weeks (around 7/21/2017). To-Do List   
 07/07/2017 Lab:  CBC WITH 3 PART DIFF Patient Instructions Iron Deficiency Anemia: Care Instructions Your Care Instructions Anemia means that you do not have enough red blood cells. Red blood cells carry oxygen around your body. When you have anemia, it can make you pale, weak, and tired. Many things can cause anemia. The most common cause is loss of blood. This can happen if you have heavy menstrual periods. It can also happen if you have bleeding in your stomach or bowel. You can also get anemia if you don't have enough iron in your diet or if it's hard for your body to absorb iron. In some cases, pregnancy causes anemia. That's because a pregnant woman needs more iron. Your doctor may do more tests to find the cause of your anemia. If a disease or other health problem is causing it, your doctor will treat that problem.  
It's important to follow up with your doctor to make sure that your iron level returns to normal. 
 Follow-up care is a key part of your treatment and safety. Be sure to make and go to all appointments, and call your doctor if you are having problems. It's also a good idea to know your test results and keep a list of the medicines you take. How can you care for yourself at home? · If your doctor recommended iron pills, take them as directed. ¨ Try to take the pills on an empty stomach. You can do this about 1 hour before or 2 hours after meals. But you may need to take iron with food to avoid an upset stomach. ¨ Do not take antacids or drink milk or anything with caffeine within 2 hours of when you take your iron. They can keep your body from absorbing the iron well. ¨ Vitamin C helps your body absorb iron. You may want to take iron pills with a glass of orange juice or some other food high in vitamin C. 
¨ Iron pills may cause stomach problems. These include heartburn, nausea, diarrhea, constipation, and cramps. It can help to drink plenty of fluids and include fruits, vegetables, and fiber in your diet. ¨ It's normal for iron pills to make your stool a greenish or grayish black. But internal bleeding can also cause dark stool. So it's important to tell your doctor about any color changes. ¨ Call your doctor if you think you are having a problem with your iron pills. Even after you start to feel better, it will take several months for your body to build up its supply of iron. ¨ If you miss a pill, don't take a double dose. ¨ Keep iron pills out of the reach of small children. Too much iron can be very dangerous. · Eat foods with a lot of iron. These include red meat, shellfish, poultry, and eggs. They also include beans, raisins, whole-grain bread, and leafy green vegetables. · Steam your vegetables. This is the best way to prepare them if you want to get as much iron as possible. · Be safe with medicines.  Do not take nonsteroidal anti-inflammatory pain relievers unless your doctor tells you to. These include aspirin, naproxen (Aleve), and ibuprofen (Advil, Motrin). · Liquid iron can stain your teeth. But you can mix it with water or juice and drink it with a straw. Then it won't get on your teeth. When should you call for help? Call 911 anytime you think you may need emergency care. For example, call if: 
· You passed out (lost consciousness). · You vomit blood or what looks like coffee grounds. · You pass maroon or very bloody stools. Call your doctor now or seek immediate medical care if: 
· Your stools are black and look like tar, or they have streaks of blood. · You are dizzy or lightheaded, or you feel like you may faint. Watch closely for changes in your health, and be sure to contact your doctor if: 
· Your fatigue and weakness continue or get worse. · You have side effects from taking iron pills, such as nausea, vomiting, constipation, diarrhea, or heartburn. · You do not get better as expected. Where can you learn more? Go to http://zoraidaBackflip Studiososcar.info/. Enter H845 in the search box to learn more about \"Iron Deficiency Anemia: Care Instructions. \" Current as of: October 13, 2016 Content Version: 11.3 © 8323-4897 [employer]. Care instructions adapted under license by [employer] (which disclaims liability or warranty for this information). If you have questions about a medical condition or this instruction, always ask your healthcare professional. [place] [address] any warranty or liability for your use of this information. Complete Blood Count (CBC): About This Test 
What is it? A complete blood count (CBC) is a blood test that gives important information about your blood cells, especially red blood cells, white blood cells, and platelets. Why is this test done?  
A CBC may be done as part of a regular physical exam. There are many other reasons that a doctor may want this blood test, including to: · Find the cause of symptoms such as fatigue, weakness, fever, bruising, or weight loss. · Find anemia or an infection. · See how much blood has been lost if there is bleeding. · Diagnose diseases of the blood, such as leukemia or polycythemia. How can you prepare for the test? 
You do not need to do anything before having this test. 
What happens during the test? 
The health professional taking a sample of your blood will: · Wrap an elastic band around your upper arm. This makes the veins below the band larger so it is easier to put a needle into the vein. · Clean the needle site with alcohol. · Put the needle into the vein. · Attach a tube to the needle to fill it with blood. · Remove the band from your arm when enough blood is collected. · Put a gauze pad or cotton ball over the needle site as the needle is removed. · Put pressure on the site and then put on a bandage. If this blood test is done on a baby, a heel stick may be done instead of a blood draw from a vein. What happens after the test? 
· You will probably be able to go home right away. · You can go back to your usual activities right away. Follow-up care is a key part of your treatment and safety. Be sure to make and go to all appointments, and call your doctor if you are having problems. It's also a good idea to keep a list of the medicines you take. Ask your doctor when you can expect to have your test results. Where can you learn more? Go to http://zoraida-oscar.info/. Enter K310 in the search box to learn more about \"Complete Blood Count (CBC): About This Test.\" Current as of: October 14, 2016 Content Version: 11.3 © 0287-9664 SmartThings. Care instructions adapted under license by [employer] (which disclaims liability or warranty for this information).  If you have questions about a medical condition or this instruction, always ask your healthcare professional. [address] any warranty or liability for your use of this information. Introducing [facility]! Charo Vail introduces TrackingPoint patient portal. Now you can access parts of your medical record, email your doctor's office, and request medication refills online. 1. In your internet browser, go to [email or web address]. [employer]/[facility] 2. Click on the First Time User? Click Here link in the Sign In box. You will see the New Member Sign Up page. 3. Enter your TrackingPoint Access Code exactly as it appears below. You will not need to use this code after youve completed the sign-up process. If you do not sign up before the expiration date, you must request a new code. · TrackingPoint Access Code: [email or web address] Expires: 10/5/2017 10:25 AM 
 
4. Enter the last four digits of your Social Security Number (xxxx) and Date of Birth (mm/dd/yyyy) as indicated and click Submit. You will be taken to the next sign-up page. 5. Create a TrackingPoint ID. This will be your TrackingPoint login ID and cannot be changed, so think of one that is secure and easy to remember. 6. Create a TrackingPoint password. You can change your password at any time. 7. Enter your Password Reset Question and Answer. This can be used at a later time if you forget your password. 8. Enter your e-mail address. You will receive e-mail notification when new information is available in [address] 9. Click Sign Up. You can now view and download portions of your medical record. 10. Click the Download Summary menu link to download a portable copy of your medical information. If you have questions, please visit the Frequently Asked Questions section of the TrackingPoint website. Remember, TrackingPoint is NOT to be used for urgent needs. For medical emergencies, dial 911. Now available from your iPhone and Android! Please provide this summary of care documentation to your next provider. Your primary care clinician is listed as [address]. If you have any questions after today's visit, please call [phone].

## 2017-07-07 NOTE — PROGRESS NOTES
Hematology/Oncology Consultation Note    Name: Freddy Reece  Date: 2017  : 1945    PCP: Gopal Sahu MD       Ms. Rolan Barrow  is a 73 y.o. woman who was referred for an evaluation of elevated platelet counts. Subjective:     Chief complaint: Elevated platelet count    History of present illness:  Mrs. Rolan Barrow is a 72-year-old woman who has a history of iron deficiency anemia. She also has histories of diabetes and hypertension. Recently serial CBCs have revealed that her platelet counts have been elevated. The most recent CBC from 2017 showed that her platelet count was 000,836. Her hemoglobin was 11 g/dL with hematocrit of 35%. The WBC count was normal at 5.4. Iron studies revealed that she had a ferritin level on 2016 of 56 ng/mL. Her iron saturation was 25% of the time and her iron level was 75 mcg/dL. The patient denies having any recent or remote history of unexplained bruising or bleeding. She has not been told that there has been a problem with her platelets in the past.  Therefore she is here for complete assessment with regards to her elevated platelet counts and secondarily an evaluation of her long-standing anemia. She does experience occasional fatigue and some weakness that she thinks may be related to the anemia. This is been going on for about 6-8 months. She has learned to moderate activity level to decrease her symptoms of fatigue. Until recently she had been taking oral iron supplement but this was discontinued.       Past Medical History:   Diagnosis Date    Anemia     Arthritis     Diabetes ([address]     Type 2    H/O seasonal allergies     High cholesterol     Hypertension        Allergies   Allergen Reactions    Lisinopril Cough    Tape [Adhesive] Rash     Itching at site       Past Surgical History:   Procedure Laterality Date    COLONOSCOPY N/A 2017    COLONOSCOPY performed by Gita Seay MD at [facility] END[facility]COPY    HX CYST INCISION AND DRAINAGE Right     \"approx 30 years ago\"    HX HYSTERECTOMY      \"approx 30 years ago\"       Social History     Social History    Marital status:      Spouse name: N/A    Number of children: N/A    Years of education: N/A     Occupational History    Not on file. Social History Main Topics    Smoking status: Never Smoker    Smokeless tobacco: Not on file    Alcohol use No    Drug use: No    Sexual activity: Not on file     Other Topics Concern    Not on file     Social History Narrative       Family History   Problem Relation Age of Onset    Diabetes Mother     Heart Disease Mother     Kidney Disease Mother     Cancer Father      h/o prostate cancer    Hypertension Father     Arthritis-osteo Father     Diabetes Sister     No Known Problems Brother     Diabetes Sister        Current Outpatient Prescriptions   Medication Sig Dispense Refill    metFORMIN (GLUCOPHAGE) 500 mg tablet Take  by mouth two (2) times daily (with meals).  losartan (COZAAR) 50 mg tablet Take 50 mg by mouth daily.  atorvastatin (LIPITOR) 40 mg tablet Take 40 mg by mouth nightly.  NIFEdipine ER (ADALAT CC) 60 mg ER tablet Take 60 mg by mouth daily.  ferrous sulfate 325 mg (65 mg iron) tablet Take  by mouth Daily (before breakfast).  fexofenadine (ALLEGRA) 180 mg tablet Take  by mouth daily as needed for Allergies.  meloxicam (MOBIC) 15 mg tablet Take 15 mg by mouth as needed for Pain. Review of Systems    General ROS:The patient has complaints of some fatigue and weakness. Psychological ROS: patient denies having any psychological symptoms such as hallucinations, depression or anxiety. Ophthalmic ROS:the patient denies having any visual impairment or eye discomfort. ENT ROS: there are no abnormalities reported.   Allergy and Immunology ROS:the patient denies having any seasonal allergies or allergies to medications other than those already outlined above.  Hematological and Lymphatic ROS: the patient denies having any bruising, bleeding or lymphadenopathy. Endocrine ROS: the patient denies having any heat or cold intolerance. There is no history of diabetes or thyroid disorders. Breast ROS: the patient denies having any history of breast mass, nipple discharge, or lumps. Respiratory ROS:the patient denies having any cough, shortness of breath, or dyspnea on exertion. Cardiovascular ROS: there are no complaints of chest pain, palpitations, chest pounding, or dyspnea on exertion. Gastrointestinal ROS: the patient denies having nausea, emesis, diarrhea, constipation, or blood in the stool. Genito-Urinary ROS: the patient denies having urinary urgency, frequency, or dysuria. Musculoskeletal ROS: with the exception of mild arthralgias the patient has no other musculoskeletal complaints. Neurological ROS: the patient denies having any numbness, tingling, or neurologic deficits. Dermatological ROS:patient denies having any unexplained rash, skin ulcerations, or hives. Objective:     Visit Vitals    /70    Pulse 75    Temp 98.2 °F (36.8 °C)    Ht 5' 5\" (1.651 m)    Wt 77 kg (169 lb 12.8 oz)    BMI 28.26 kg/m2        Physical Exam:   Gen. Appearance: the patient is in no acute distress. Skin: There is no evidence of bruise or rash. HEENT: The head is normocephalic and atraumatic. The conjunctiva and sclera are clear. Pupils are equal, round, reactive to light, and accommodation. The extraocular movements are intact. ENT reveals no oral mucosal lesions or ulcerations. Neck: Supple without lymphadenopathy or thyromegaly. Lungs: Clear to auscultation and percussion; there are no wheezes or rhonchi. Heart: Regular rate and rhythm; there are no murmurs, gallops, or rubs. Abdomen: Bowel sounds are present and normal.  There is no guarding, tenderness, or hepatosplenomegaly. Extremities: There is no clubbing, cyanosis, or edema. Neurologic: There are no focal neurologic deficits. Lymphatics: There is no palpable peripheral lymphadenopathy. Lab data: The CBC dated 6/14/2017 shows WBC count of 6.2, hemoglobin 10.9 g/dL, hematocrit 35%, and the platelet count was 805,194. Assessment: Thrombocytosis: I have explained to the patient that the diagnostic differential includes reactive thrombocytosis versus an evolving myeloproliferative disorder such as essential thrombocytosis    Chronic anemia: I suspect that the patient has functional iron deficiency anemia versus an evolving myelodysplastic syndrome. Plan: Thrombocytosis: At this time I will obtain a competency metabolic panel, CBC, and will request a JA K2 mutation analysis to assess for any evidence that she may be developing a mutation associated essential thrombocytosis. Chronic anemia: The iron profile, ferritin level, and CBC will be obtained. I will see the patient back in clinic in 2 weeks to review lab data and to decide whether or not additional oral iron therapy is recommended versus intravenous iron in the setting of functional iron deficiency anemia. Follow-up in 2 weeks    Orders Placed This Encounter    COMPLETE CBC & AUTO DIFF WBC    InHouse CBC ([facility])     Standing Status:   Future     Number of Occurrences:   1     Standing Expiration Date:   9/47/0684    METABOLIC PANEL, COMPREHENSIVE     Standing Status:   Future     Standing Expiration Date:   7/8/2018    IRON PROFILE     Standing Status:   Future     Standing Expiration Date:   7/8/2018    SPEP     Standing Status:   Future     Standing Expiration Date:   7/8/2018    FERRITIN     Standing Status:   Future     Standing Expiration Date:   7/8/2018    JAK2 MUTATION ANALYSIS     Standing Status:   Future     Standing Expiration Date:   7/8/2018           Ramesh Girard MD  7/7/2017      Please note: This document has been produced using voice recognition software.  Unrecognized errors in transcription may be present.

## 2017-07-10 LAB
ALBUMIN SERPL ELPH-MCNC: 4 G/DL (ref 2.9–4.4)
ALBUMIN/GLOB SERPL: 1.3 {RATIO} (ref 0.7–1.7)
ALPHA1 GLOB SERPL ELPH-MCNC: 0.2 G/DL (ref 0–0.4)
ALPHA2 GLOB SERPL ELPH-MCNC: 0.9 G/DL (ref 0.4–1)
B-GLOBULIN SERPL ELPH-MCNC: 1.1 G/DL (ref 0.7–1.3)
GAMMA GLOB SERPL ELPH-MCNC: 0.9 G/DL (ref 0.4–1.8)
GLOBULIN SER CALC-MCNC: 3.1 G/DL (ref 2.2–3.9)
M PROTEIN SERPL ELPH-MCNC: NORMAL G/DL
PROT SERPL-MCNC: 7.1 G/DL (ref 6–8.5)

## 2017-07-21 ENCOUNTER — OFFICE VISIT (OUTPATIENT)
Dept: ONCOLOGY | Age: 72
End: 2017-07-21

## 2017-07-21 VITALS
TEMPERATURE: 98.2 F | WEIGHT: 170.6 LBS | DIASTOLIC BLOOD PRESSURE: 70 MMHG | HEART RATE: 78 BPM | SYSTOLIC BLOOD PRESSURE: 131 MMHG | BODY MASS INDEX: 28.42 KG/M2 | HEIGHT: 65 IN

## 2017-07-21 DIAGNOSIS — D50.8 IRON DEFICIENCY ANEMIA SECONDARY TO INADEQUATE DIETARY IRON INTAKE: ICD-10-CM

## 2017-07-21 DIAGNOSIS — D75.839 THROMBOCYTOSIS: Primary | ICD-10-CM

## 2017-07-21 LAB
BACKGROUND: 489207: NORMAL
DIRECTOR REVIEW: 489204: NORMAL
JAK2 P.V617F BLD/T QL: NORMAL

## 2017-07-21 NOTE — PATIENT INSTRUCTIONS
Complete Blood Count (CBC): About This Test  What is it? A complete blood count (CBC) is a blood test that gives important information about your blood cells, especially red blood cells, white blood cells, and platelets. Why is this test done? A CBC may be done as part of a regular physical exam. There are many other reasons that a doctor may want this blood test, including to:  · Find the cause of symptoms such as fatigue, weakness, fever, bruising, or weight loss. · Find anemia or an infection. · See how much blood has been lost if there is bleeding. · Diagnose diseases of the blood, such as leukemia or polycythemia. How can you prepare for the test?  You do not need to do anything before having this test.  What happens during the test?  The health professional taking a sample of your blood will:  · Wrap an elastic band around your upper arm. This makes the veins below the band larger so it is easier to put a needle into the vein. · Clean the needle site with alcohol. · Put the needle into the vein. · Attach a tube to the needle to fill it with blood. · Remove the band from your arm when enough blood is collected. · Put a gauze pad or cotton ball over the needle site as the needle is removed. · Put pressure on the site and then put on a bandage. If this blood test is done on a baby, a heel stick may be done instead of a blood draw from a vein. What happens after the test?  · You will probably be able to go home right away. · You can go back to your usual activities right away. Follow-up care is a key part of your treatment and safety. Be sure to make and go to all appointments, and call your doctor if you are having problems. It's also a good idea to keep a list of the medicines you take. Ask your doctor when you can expect to have your test results. Where can you learn more? Go to http://zoraida-oscar.info/.   Enter T799 in the search box to learn more about \"Complete Blood Count (CBC): About This Test.\"  Current as of: October 14, 2016  Content Version: 11.3  © 4996-7890 [employer]. Care instructions adapted under license by [employer] (which disclaims liability or warranty for this information). If you have questions about a medical condition or this instruction, always ask your healthcare professional. [place] [address] any warranty or liability for your use of this information.

## 2017-07-21 NOTE — MR AVS SNAPSHOT
Visit Information Date & Time Provider Department Dept. Phone Encounter #  
 7/21/2017  1:45 PM [facility] Office [phone] [id number] Follow-up Instructions Return in about 3 months (around 10/21/2017). Upcoming Health Maintenance Date Due Hepatitis C Screening 1945 DTaP/Tdap/Td series (1 - Tdap) 8/17/1968 BREAST CANCER SCRN MAMMOGRAM 8/17/1997 FOBT Q 1 YEAR AGE 50-75 8/17/1997 ZOSTER VACCINE AGE 60> 6/17/2007 GLAUCOMA SCREENING Q2Y 8/17/2012 Pneumococcal 65+ Low/Medium Risk (1 of 2 - PCV13) 8/17/2012 MEDICARE YEARLY EXAM 8/17/2012 INFLUENZA AGE 9 TO ADULT 8/1/2017 Allergies as of 7/21/2017  Review Complete On: 7/21/2017 By: Brandie Mueller MD  
  
 Severity Noted Reaction Type Reactions Lisinopril  01/11/2017    Cough Tape [Adhesive] Low 01/12/2017   Intolerance Rash Itching at site Current Immunizations  Never Reviewed No immunizations on file. Not reviewed this visit You Were Diagnosed With   
  
 Codes Comments Thrombocytosis ([facility] [address]    -  Primary ICD-10-CM: D47.3 ICD-9-CM: 238.71 Iron deficiency anemia secondary to inadequate dietary iron intake     ICD-10-CM: D50.8 ICD-9-CM: 280.1 Vitals OB Status Smoking Status Hysterectomy Never Smoker Your Updated Medication List  
  
   
This list is accurate as of: 7/21/17  2:18 PM.  Always use your most recent med list.  
  
  
  
  
 atorvastatin 40 mg tablet Commonly known as:  LIPITOR Take 40 mg by mouth nightly. ferrous sulfate 325 mg (65 mg iron) tablet Take  by mouth Daily (before breakfast). fexofenadine 180 mg tablet Commonly known as:  Bailey Lapidus Take  by mouth daily as needed for Allergies. losartan 50 mg tablet Commonly known as:  COZAAR Take 50 mg by mouth daily. meloxicam 15 mg tablet Commonly known as:  MOBIC Take 15 mg by mouth as needed for Pain. metFORMIN 500 mg tablet Commonly known as:  GLUCOPHAGE Take  by mouth two (2) times daily (with meals). NIFEdipine ER 60 mg ER tablet Commonly known as:  ADALAT CC Take 60 mg by mouth daily. We Performed the Following REFERRAL TO HEMATOLOGY [OPK54 Custom] Comments: This order was created through External Result Entry Follow-up Instructions Return in about 3 months (around 10/21/2017). Patient Instructions Complete Blood Count (CBC): About This Test 
What is it? A complete blood count (CBC) is a blood test that gives important information about your blood cells, especially red blood cells, white blood cells, and platelets. Why is this test done? A CBC may be done as part of a regular physical exam. There are many other reasons that a doctor may want this blood test, including to: · Find the cause of symptoms such as fatigue, weakness, fever, bruising, or weight loss. · Find anemia or an infection. · See how much blood has been lost if there is bleeding. · Diagnose diseases of the blood, such as leukemia or polycythemia. How can you prepare for the test? 
You do not need to do anything before having this test. 
What happens during the test? 
The health professional taking a sample of your blood will: · Wrap an elastic band around your upper arm. This makes the veins below the band larger so it is easier to put a needle into the vein. · Clean the needle site with alcohol. · Put the needle into the vein. · Attach a tube to the needle to fill it with blood. · Remove the band from your arm when enough blood is collected. · Put a gauze pad or cotton ball over the needle site as the needle is removed. · Put pressure on the site and then put on a bandage. If this blood test is done on a baby, a heel stick may be done instead of a blood draw from a vein. What happens after the test? 
· You will probably be able to go home right away. · You can go back to your usual activities right away. Follow-up care is a key part of your treatment and safety. Be sure to make and go to all appointments, and call your doctor if you are having problems. It's also a good idea to keep a list of the medicines you take. Ask your doctor when you can expect to have your test results. Where can you learn more? Go to http://zoraida-oscar.info/. Enter Z979 in the search box to learn more about \"Complete Blood Count (CBC): About This Test.\" Current as of: October 14, 2016 Content Version: 11.3 © 8368-8448 [employer]. Care instructions adapted under license by [employer] (which disclaims liability or warranty for this information). If you have questions about a medical condition or this instruction, always ask your healthcare professional. [address] any warranty or liability for your use of this information. Introducing [facility]! Elder Selby introduces [employer] patient portal. Now you can access parts of your medical record, email your doctor's office, and request medication refills online. 1. In your internet browser, go to [email or web address] 2. Click on the First Time User? Click Here link in the Sign In box. You will see the New Member Sign Up page. 3. Enter your [employer] Access Code exactly as it appears below. You will not need to use this code after youve completed the sign-up process. If you do not sign up before the expiration date, you must request a new code. · [employer] Access Code: [email or web address] Expires: 10/5/2017 10:25 AM 
 
4. Enter the last four digits of your Social Security Number (xxxx) and Date of Birth (mm/dd/yyyy) as indicated and click Submit. You will be taken to the next sign-up page. 5. Create a [employer] ID. This will be your [employer] login ID and cannot be changed, so think of one that is secure and easy to remember. 6. Create a [employer] password. You can change your password at any time. 7. Enter your Password Reset Question and Answer. This can be used at a later time if you forget your password. 8. Enter your e-mail address. You will receive e-mail notification when new information is available in [address] 9. Click Sign Up. You can now view and download portions of your medical record. 10. Click the Download Summary menu link to download a portable copy of your medical information. If you have questions, please visit the Frequently Asked Questions section of the [employer] website. Remember, [employer] is NOT to be used for urgent needs. For medical emergencies, dial 911. Now available from your iPhone and Android! Please provide this summary of care documentation to your next provider. Your primary care clinician is listed as [facility]. If you have any questions after today's visit, please call [phone].

## 2017-10-24 ENCOUNTER — OFFICE VISIT (OUTPATIENT)
Dept: ONCOLOGY | Age: 72
End: 2017-10-24

## 2017-10-24 ENCOUNTER — HOSPITAL ENCOUNTER (OUTPATIENT)
Dept: ONCOLOGY | Age: 72
Discharge: HOME | End: 2017-10-24

## 2017-10-24 ENCOUNTER — HOSPITAL ENCOUNTER (OUTPATIENT)
Dept: LAB | Age: 72
Discharge: HOME | End: 2017-10-24
Payer: MEDICARE

## 2017-10-24 VITALS
TEMPERATURE: 98.4 F | DIASTOLIC BLOOD PRESSURE: 68 MMHG | WEIGHT: 171 LBS | SYSTOLIC BLOOD PRESSURE: 121 MMHG | HEART RATE: 94 BPM | BODY MASS INDEX: 28.46 KG/M2

## 2017-10-24 DIAGNOSIS — D75.839 THROMBOCYTOSIS: ICD-10-CM

## 2017-10-24 DIAGNOSIS — D75.839 THROMBOCYTOSIS: Primary | ICD-10-CM

## 2017-10-24 DIAGNOSIS — D50.8 IRON DEFICIENCY ANEMIA SECONDARY TO INADEQUATE DIETARY IRON INTAKE: ICD-10-CM

## 2017-10-24 LAB
ALBUMIN SERPL-MCNC: 4.1 G/DL (ref 3.4–5)
ALBUMIN/GLOB SERPL: 1.5 {RATIO} (ref 0.8–1.7)
ALP SERPL-CCNC: 96 U/L (ref 45–117)
ALT SERPL-CCNC: 24 U/L (ref 13–56)
ANION GAP SERPL CALC-SCNC: 10 MMOL/L (ref 3–18)
AST SERPL-CCNC: 23 U/L (ref 15–37)
BASO+EOS+MONOS # BLD AUTO: 0.6 K/UL (ref 0–2.3)
BASO+EOS+MONOS # BLD AUTO: 11 % (ref 0.1–17)
BILIRUB SERPL-MCNC: 0.3 MG/DL (ref 0.2–1)
BUN SERPL-MCNC: 16 MG/DL (ref 7–18)
BUN/CREAT SERPL: 14 (ref 12–20)
CALCIUM SERPL-MCNC: 9.1 MG/DL (ref 8.5–10.1)
CHLORIDE SERPL-SCNC: 108 MMOL/L (ref 100–108)
CO2 SERPL-SCNC: 26 MMOL/L (ref 21–32)
CREAT SERPL-MCNC: 1.11 MG/DL (ref 0.6–1.3)
DIFFERENTIAL METHOD BLD: ABNORMAL
ERYTHROCYTE [DISTWIDTH] IN BLOOD BY AUTOMATED COUNT: 13.6 % (ref 11.5–14.5)
FERRITIN SERPL-MCNC: 80 NG/ML (ref 8–388)
GLOBULIN SER CALC-MCNC: 2.8 G/DL (ref 2–4)
GLUCOSE SERPL-MCNC: 129 MG/DL (ref 74–99)
HCT VFR BLD AUTO: 36.5 % (ref 36–48)
HGB BLD-MCNC: 11.2 G/DL (ref 12–16)
IRON SATN MFR SERPL: 35 %
IRON SERPL-MCNC: 105 UG/DL (ref 50–175)
LYMPHOCYTES # BLD: 2.3 K/UL (ref 1.1–5.9)
LYMPHOCYTES NFR BLD: 40 % (ref 14–44)
MCH RBC QN AUTO: 25 PG (ref 25–35)
MCHC RBC AUTO-ENTMCNC: 30.7 G/DL (ref 31–37)
MCV RBC AUTO: 81.5 FL (ref 78–102)
NEUTS SEG # BLD: 2.8 K/UL (ref 1.8–9.5)
NEUTS SEG NFR BLD: 49 % (ref 40–70)
PLATELET # BLD AUTO: 380 K/UL (ref 140–440)
POTASSIUM SERPL-SCNC: 3.6 MMOL/L (ref 3.5–5.5)
PROT SERPL-MCNC: 6.9 G/DL (ref 6.4–8.2)
RBC # BLD AUTO: 4.48 M/UL (ref 4.1–5.1)
SODIUM SERPL-SCNC: 144 MMOL/L (ref 136–145)
TIBC SERPL-MCNC: 304 UG/DL (ref 250–450)
WBC # BLD AUTO: 5.7 K/UL (ref 4.5–13)

## 2017-10-24 PROCEDURE — 82728 ASSAY OF FERRITIN: CPT | Performed by: INTERNAL MEDICINE

## 2017-10-24 PROCEDURE — 36415 COLL VENOUS BLD VENIPUNCTURE: CPT | Performed by: INTERNAL MEDICINE

## 2017-10-24 PROCEDURE — 80053 COMPREHEN METABOLIC PANEL: CPT | Performed by: INTERNAL MEDICINE

## 2017-10-24 PROCEDURE — 83540 ASSAY OF IRON: CPT | Performed by: INTERNAL MEDICINE

## 2017-10-24 NOTE — PROGRESS NOTES
Hematology/Oncology  Progress Note    Name: Romero [place]  Date: 10/24/2017  : 1945    PCP: Sena Reddy MD     Ms. Hoang Wood is a 81 y.o.  female who was seen for management of her reactive thrombocytosis and iron deficiency anemia. Current therapy: Oral iron supplement once daily. Subjective:     Mrs. Hoang Wood is a 70-year-old woman who has a history of reactive thrombocytosis and iron deficiency anemia. She is doing well. She is continue to take the oral iron supplement once daily. She has no complaints of constipation or abdominal cramps. She denies having any blood in her stool or urine. Past medical history, family history, and social history: these were reviewed and remains unchanged. Past Medical History:   Diagnosis Date    Anemia     Arthritis     Diabetes ([address]     Type 2    H/O seasonal allergies     High cholesterol     Hypertension      Past Surgical History:   Procedure Laterality Date    COLONOSCOPY N/A 2017    COLONOSCOPY performed by Laci Villaseñor MD at [facility] ENDOSCOPY    HX CYST INCISION AND DRAINAGE Right     \"approx 30 years ago\"    HX HYSTERECTOMY      \"approx 30 years ago\"     Social History     Social History    Marital status:      Spouse name: N/A    Number of children: N/A    Years of education: N/A     Occupational History    Not on file.      Social History Main Topics    Smoking status: Never Smoker    Smokeless tobacco: Not on file    Alcohol use No    Drug use: No    Sexual activity: Not on file     Other Topics Concern    Not on file     Social History Narrative     Family History   Problem Relation Age of Onset    Diabetes Mother     Heart Disease Mother     Kidney Disease Mother     Cancer Father      h/o prostate cancer    Hypertension Father     Arthritis-osteo Father     Diabetes Sister     No Known Problems Brother     Diabetes Sister      Current Outpatient Prescriptions   Medication Sig Dispense Refill    metFORMIN (GLUCOPHAGE) 500 mg tablet Take  by mouth two (2) times daily (with meals).  losartan (COZAAR) 50 mg tablet Take 50 mg by mouth daily.  atorvastatin (LIPITOR) 40 mg tablet Take 40 mg by mouth nightly.  NIFEdipine ER (ADALAT CC) 60 mg ER tablet Take 60 mg by mouth daily.  ferrous sulfate 325 mg (65 mg iron) tablet Take  by mouth Daily (before breakfast).  fexofenadine (ALLEGRA) 180 mg tablet Take  by mouth daily as needed for Allergies.  meloxicam (MOBIC) 15 mg tablet Take 15 mg by mouth as needed for Pain. Review of Systems  Constitutional: The patient has no acute distress or discomfort. HEENT: The patient denies recent head trauma, eye pain, blurred vision,  hearing deficit, oropharyngeal mucosal pain or lesions, and the patient denies throat pain or discomfort. Lymphatics: The patient denies palpable peripheral lymphadenopathy. Hematologic: The patient denies having bruising, bleeding, or progressive fatigue. Respiratory: Patient denies having shortness of breath, cough, sputum production, fever, or dyspnea on exertion. Cardiovascular: The patient denies having leg pain, leg swelling, heart palpitations, chest permit, chest pain, or lightheadedness. The patient denies having dyspnea on exertion. Gastrointestinal: The patient denies having nausea, emesis, or diarrhea. The patient denies having any hematemesis or blood in the stool. Genitourinary: Patient denies having urinary urgency, frequency, or dysuria. The patient denies having blood in the urine. Psychological: The patient denies having symptoms of nervousness, anxiety, depression, or thoughts of harming self. Skin: Patient denies having skin rashes, skin, ulcerations, or unexplained itching or pruritus. Musculoskeletal: The patient denies having pain in the joints or bones.       Objective:     Visit Vitals    /68 (BP 1 Location: Left arm, BP Patient Position: Sitting)    Pulse 94  Temp 98.4 °F (36.9 °C) (Oral)    Wt 77.6 kg (171 lb)    BMI 28.46 kg/m2     Physical Exam:   Gen. Appearance: The patient is in no acute distress. Skin: There is no bruise or rash. HEENT: The exam is unremarkable. Neck: Supple without lymphadenopathy or thyromegaly. Lungs: Clear to auscultation and percussion; there are no wheezes or rhonchi. Heart: Regular rate and rhythm; there are no murmurs, gallops, or rubs. Abdomen: Bowel sounds are present and normal.  There is no guarding, tenderness, or hepatosplenomegaly. Extremities: There is no clubbing, cyanosis, or edema. Neurologic: There are no focal neurologic deficits. Lymphatics: There is no palpable peripheral lymphadenopathy. Musculoskeletal: The patient has full range of motion at all joints. There is no evidence of joint deformity or effusions. There is no focal joint tenderness. Psychological/psychiatric: There is no clinical evidence of anxiety, depression, or melancholy. Lab data:      Results for orders placed or performed during the hospital encounter of 10/24/17   CBC WITH 3 PART DIFF     Status: Abnormal   Result Value Ref Range Status    WBC 5.7 4.5 - 13.0 K/uL Final    RBC 4.48 4.10 - 5.10 M/uL Final    HGB 11.2 (L) 12.0 - 16.0 g/dL Final    HCT 36.5 36 - 48 % Final    MCV 81.5 78 - 102 FL Final    MCH 25.0 25.0 - 35.0 PG Final    MCHC 30.7 (L) 31 - 37 g/dL Final    RDW 13.6 11.5 - 14.5 % Final    PLATELET 322 023 - 847 K/uL Final    NEUTROPHILS 49 40 - 70 % Final    MIXED CELLS 11 0.1 - 17 % Final    LYMPHOCYTES 40 14 - 44 % Final    ABS. NEUTROPHILS 2.8 1.8 - 9.5 K/UL Final    ABS. MIXED CELLS 0.6 0.0 - 2.3 K/uL Final    ABS. LYMPHOCYTES 2.3 1.1 - 5.9 K/UL Final     Comment: Test performed at [place] [address] Location. Results Reviewed by Medical Director. DF AUTOMATED   Final           Assessment:     1. Thrombocytosis ([address]    2.  Iron deficiency anemia secondary to inadequate dietary iron intake      Plan: Reactive thrombocytosis: I have explained to the patient that her CBC from today shows that her platelet count is normal at 380,000. We will continue to monitor this at 3-4 month intervals. Iron deficiency anemia: The patient's hemoglobin is improving. The CBC from today shows that her hemoglobin is 11.2 g/dL with hematocrit 36.5%. The most recent iron studies from 7/7/2017 showed a ferritin level of 58 ng/mL with an iron level of 86 mcg/dL. The iron saturation was normal.  I have advised the patient to continue taking the oral iron supplement once daily. Follow-up in 3 months. Orders Placed This Encounter    COMPLETE CBC & AUTO DIFF WBC    InHouse CBC ([employer])     Standing Status:   Future     Number of Occurrences:   1     Standing Expiration Date:   07/80/9519    METABOLIC PANEL, COMPREHENSIVE     Standing Status:   Future     Standing Expiration Date:   10/25/2018    IRON PROFILE     Standing Status:   Future     Standing Expiration Date:   10/25/2018    FERRITIN     Standing Status:   Future     Standing Expiration Date:   10/25/2018       Clay Mack MD  10/24/2017      Please note: This document has been produced using voice recognition software. Unrecognized errors in transcription may be present.

## 2017-10-24 NOTE — PATIENT INSTRUCTIONS
Iron Deficiency Anemia: Care Instructions  Your Care Instructions    Anemia means that you do not have enough red blood cells. Red blood cells carry oxygen around your body. When you have anemia, it can make you pale, weak, and tired. Many things can cause anemia. The most common cause is loss of blood. This can happen if you have heavy menstrual periods. It can also happen if you have bleeding in your stomach or bowel. You can also get anemia if you don't have enough iron in your diet or if it's hard for your body to absorb iron. In some cases, pregnancy causes anemia. That's because a pregnant woman needs more iron. Your doctor may do more tests to find the cause of your anemia. If a disease or other health problem is causing it, your doctor will treat that problem. It's important to follow up with your doctor to make sure that your iron level returns to normal.  Follow-up care is a key part of your treatment and safety. Be sure to make and go to all appointments, and call your doctor if you are having problems. It's also a good idea to know your test results and keep a list of the medicines you take. How can you care for yourself at home? · If your doctor recommended iron pills, take them as directed. ¨ Try to take the pills on an empty stomach. You can do this about 1 hour before or 2 hours after meals. But you may need to take iron with food to avoid an upset stomach. ¨ Do not take antacids or drink milk or anything with caffeine within 2 hours of when you take your iron. They can keep your body from absorbing the iron well. ¨ Vitamin C helps your body absorb iron. You may want to take iron pills with a glass of orange juice or some other food high in vitamin C.  ¨ Iron pills may cause stomach problems. These include heartburn, nausea, diarrhea, constipation, and cramps. It can help to drink plenty of fluids and include fruits, vegetables, and fiber in your diet.   ¨ It's normal for iron pills to make your stool a greenish or grayish black. But internal bleeding can also cause dark stool. So it's important to tell your doctor about any color changes. ¨ Call your doctor if you think you are having a problem with your iron pills. Even after you start to feel better, it will take several months for your body to build up its supply of iron. ¨ If you miss a pill, don't take a double dose. ¨ Keep iron pills out of the reach of small children. Too much iron can be very dangerous. · Eat foods with a lot of iron. These include red meat, shellfish, poultry, and eggs. They also include beans, raisins, whole-grain bread, and leafy green vegetables. · Steam your vegetables. This is the best way to prepare them if you want to get as much iron as possible. · Be safe with medicines. Do not take nonsteroidal anti-inflammatory pain relievers unless your doctor tells you to. These include aspirin, naproxen (Aleve), and ibuprofen (Advil, Motrin). · Liquid iron can stain your teeth. But you can mix it with water or juice and drink it with a straw. Then it won't get on your teeth. When should you call for help? Call 911 anytime you think you may need emergency care. For example, call if:  · You passed out (lost consciousness). · You vomit blood or what looks like coffee grounds. · You pass maroon or very bloody stools. Call your doctor now or seek immediate medical care if:  · Your stools are black and look like tar, or they have streaks of blood. · You are dizzy or lightheaded, or you feel like you may faint. Watch closely for changes in your health, and be sure to contact your doctor if:  · Your fatigue and weakness continue or get worse. · You have side effects from taking iron pills, such as nausea, vomiting, constipation, diarrhea, or heartburn. · You do not get better as expected. Where can you learn more? Go to http://brian.info/.   Enter Z467 in the search box to learn more about \"Iron Deficiency Anemia: Care Instructions. \"  Current as of: October 13, 2016  Content Version: 11.3  © 6777-6776 [employer]. Care instructions adapted under license by [employer] (which disclaims liability or warranty for this information). If you have questions about a medical condition or this instruction, always ask your healthcare professional. [address] any warranty or liability for your use of this information.

## 2017-10-24 NOTE — MR AVS SNAPSHOT
Visit Information Date & Time Provider Department Dept. Phone Encounter #  
 10/24/2017  2:00 PM Johnna Clement [facility]ann[facility]ch[facility]ail[facility] Office [phone] [id number] Follow-up Instructions Return in about 3 months (around 1/24/2018). Your Appointments 1/23/2018  2:45 PM  
Office Visit with Johnna Clement MD  
[facility] 77 365[address] [facility]) Appt Note: 3 mo fu  
 [facility] 9938 [facility] [address] [place] [address]  
[phone]  
  
   
 [facility] [address] Upcoming Health Maintenance Date Due Hepatitis C Screening 1945 DTaP/Tdap/Td series (1 - Tdap) 8/17/1968 BREAST CANCER SCRN MAMMOGRAM 8/17/1997 FOBT Q 1 YEAR AGE 50-75 8/17/1997 ZOSTER VACCINE AGE 60> 6/17/2007 GLAUCOMA SCREENING Q2Y 8/17/2012 Pneumococcal 65+ Low/Medium Risk (1 of 2 - PCV13) 8/17/2012 MEDICARE YEARLY EXAM 8/17/2012 INFLUENZA AGE 9 TO ADULT 8/1/2017 Allergies as of 10/24/2017  Review Complete On: 10/24/2017 By: Johnna Clement MD  
  
 Severity Noted Reaction Type Reactions Lisinopril  01/11/2017    Cough Tape [Adhesive] Low 01/12/2017   Intolerance Rash Itching at site Current Immunizations  Never Reviewed No immunizations on file. Not reviewed this visit You Were Diagnosed With   
  
 Codes Comments [facility]romb[facility] ([facility]    -  Primary ICD-10-CM: D47.3 ICD-9-CM: 238.71 Iron deficiency anemia secondary to inadequate dietary iron intake     ICD-10-CM: D50.8 ICD-9-CM: 280.1 Vitals BP Pulse Temp Weight(growth percentile) BMI OB Status 121/68 (BP 1 Location: Left arm, BP Patient Position: Sitting) 94 98.4 °F (36.9 °C) (Oral) 171 lb (77.6 kg) 28.46 kg/m2 Hysterectomy Smoking Status Never Smoker BMI and BSA Data Body Mass Index Body Surface Area  
 28.46 kg/m 2 1.89 m 2 Your Updated Medication List  
  
   
 This list is accurate as of: 10/24/17  2:51 PM.  Always use your most recent med list.  
  
  
  
  
 atorvastatin 40 mg tablet Commonly known as:  LIPITOR Take 40 mg by mouth nightly. ferrous sulfate 325 mg (65 mg iron) tablet Take  by mouth Daily (before breakfast). fexofenadine 180 mg tablet Commonly known as:  Gloriaelinor Cao Take  by mouth daily as needed for Allergies. losartan 50 mg tablet Commonly known as:  COZAAR Take 50 mg by mouth daily. meloxicam 15 mg tablet Commonly known as:  MOBIC Take 15 mg by mouth as needed for Pain.  
  
 metFORMIN 500 mg tablet Commonly known as:  GLUCOPHAGE Take  by mouth two (2) times daily (with meals). NIFEdipine ER 60 mg ER tablet Commonly known as:  ADALAT CC Take 60 mg by mouth daily. We Performed the Following COMPLETE CBC & AUTO DIFF WBC [52193 CPT(R)] Follow-up Instructions Return in about 3 months (around 1/24/2018). To-Do List   
 10/24/2017 Lab:  CBC WITH 3 PART DIFF Patient Instructions Iron Deficiency Anemia: Care Instructions Your Care Instructions Anemia means that you do not have enough red blood cells. Red blood cells carry oxygen around your body. When you have anemia, it can make you pale, weak, and tired. Many things can cause anemia. The most common cause is loss of blood. This can happen if you have heavy menstrual periods. It can also happen if you have bleeding in your stomach or bowel. You can also get anemia if you don't have enough iron in your diet or if it's hard for your body to absorb iron. In some cases, pregnancy causes anemia. That's because a pregnant woman needs more iron. Your doctor may do more tests to find the cause of your anemia. If a disease or other health problem is causing it, your doctor will treat that problem.  
It's important to follow up with your doctor to make sure that your iron level returns to normal. 
 Follow-up care is a key part of your treatment and safety. Be sure to make and go to all appointments, and call your doctor if you are having problems. It's also a good idea to know your test results and keep a list of the medicines you take. How can you care for yourself at home? · If your doctor recommended iron pills, take them as directed. ¨ Try to take the pills on an empty stomach. You can do this about 1 hour before or 2 hours after meals. But you may need to take iron with food to avoid an upset stomach. ¨ Do not take antacids or drink milk or anything with caffeine within 2 hours of when you take your iron. They can keep your body from absorbing the iron well. ¨ Vitamin C helps your body absorb iron. You may want to take iron pills with a glass of orange juice or some other food high in vitamin C. 
¨ Iron pills may cause stomach problems. These include heartburn, nausea, diarrhea, constipation, and cramps. It can help to drink plenty of fluids and include fruits, vegetables, and fiber in your diet. ¨ It's normal for iron pills to make your stool a greenish or grayish black. But internal bleeding can also cause dark stool. So it's important to tell your doctor about any color changes. ¨ Call your doctor if you think you are having a problem with your iron pills. Even after you start to feel better, it will take several months for your body to build up its supply of iron. ¨ If you miss a pill, don't take a double dose. ¨ Keep iron pills out of the reach of small children. Too much iron can be very dangerous. · Eat foods with a lot of iron. These include red meat, shellfish, poultry, and eggs. They also include beans, raisins, whole-grain bread, and leafy green vegetables. · Steam your vegetables. This is the best way to prepare them if you want to get as much iron as possible. · Be safe with medicines.  Do not take nonsteroidal anti-inflammatory pain relievers unless your doctor tells you to. These include aspirin, naproxen (Aleve), and ibuprofen (Advil, Motrin). · Liquid iron can stain your teeth. But you can mix it with water or juice and drink it with a straw. Then it won't get on your teeth. When should you call for help? Call 911 anytime you think you may need emergency care. For example, call if: 
· You passed out (lost consciousness). · You vomit blood or what looks like coffee grounds. · You pass maroon or very bloody stools. Call your doctor now or seek immediate medical care if: 
· Your stools are black and look like tar, or they have streaks of blood. · You are dizzy or lightheaded, or you feel like you may faint. Watch closely for changes in your health, and be sure to contact your doctor if: 
· Your fatigue and weakness continue or get worse. · You have side effects from taking iron pills, such as nausea, vomiting, constipation, diarrhea, or heartburn. · You do not get better as expected. Where can you learn more? Go to http://zoraida-oscar.info/. Enter T379 in the search box to learn more about \"Iron Deficiency Anemia: Care Instructions. \" Current as of: October 13, 2016 Content Version: 11.3 © 5845-6785 watAgame. Care instructions adapted under license by [employer] (which disclaims liability or warranty for this information). If you have questions about a medical condition or this instruction, always ask your healthcare professional. [place] [address] any warranty or liability for your use of this information. Introducing [facility] & HEALTH SERVICES! Valerio Galvan introduces X[employer]ium patient portal. Now you can access parts of your medical record, email your doctor's office, and request medication refills online. 1. In your internet browser, go to [email or web address] 2. Click on the First Time User? Click Here link in the Sign In box.  You will see the New Member Sign Up page. 3. Enter your [employer] Access Code exactly as it appears below. You will not need to use this code after youve completed the sign-up process. If you do not sign up before the expiration date, you must request a new code. · [employer] Access Code: [email or web address] Expires: 1/22/2018  2:51 PM 
 
4. Enter the last four digits of your Social Security Number (xxxx) and Date of Birth (mm/dd/yyyy) as indicated and click Submit. You will be taken to the next sign-up page. 5. Create a Xspandt ID. This will be your [employer] login ID and cannot be changed, so think of one that is secure and easy to remember. 6. Create a [employer] password. You can change your password at any time. 7. Enter your Password Reset Question and Answer. This can be used at a later time if you forget your password. 8. Enter your e-mail address. You will receive e-mail notification when new information is available in [address] 9. Click Sign Up. You can now view and download portions of your medical record. 10. Click the Download Summary menu link to download a portable copy of your medical information. If you have questions, please visit the Frequently Asked Questions section of the [employer] website. Remember, [employer] is NOT to be used for urgent needs. For medical emergencies, dial 911. Now available from your iPhone and Android! Please provide this summary of care documentation to your next provider. Your primary care clinician is listed as [address] [facility]. If you have any questions after today's visit, please call [phone].

## 2018-01-23 ENCOUNTER — OFFICE VISIT (OUTPATIENT)
Dept: ONCOLOGY | Age: 73
End: 2018-01-23

## 2018-01-23 ENCOUNTER — HOSPITAL ENCOUNTER (OUTPATIENT)
Dept: ONCOLOGY | Age: 73
Discharge: HOME | End: 2018-01-23

## 2018-01-23 ENCOUNTER — HOSPITAL ENCOUNTER (OUTPATIENT)
Dept: LAB | Age: 73
Discharge: HOME | End: 2018-01-23
Payer: MEDICARE

## 2018-01-23 VITALS
BODY MASS INDEX: 29.79 KG/M2 | SYSTOLIC BLOOD PRESSURE: 141 MMHG | DIASTOLIC BLOOD PRESSURE: 70 MMHG | TEMPERATURE: 98.3 F | HEART RATE: 80 BPM | WEIGHT: 179 LBS

## 2018-01-23 DIAGNOSIS — D75.839 THROMBOCYTOSIS: Primary | ICD-10-CM

## 2018-01-23 DIAGNOSIS — D50.8 IRON DEFICIENCY ANEMIA SECONDARY TO INADEQUATE DIETARY IRON INTAKE: ICD-10-CM

## 2018-01-23 DIAGNOSIS — D75.839 THROMBOCYTOSIS: ICD-10-CM

## 2018-01-23 LAB
ALBUMIN SERPL-MCNC: 4.3 G/DL (ref 3.4–5)
ALBUMIN/GLOB SERPL: 1.4 {RATIO} (ref 0.8–1.7)
ALP SERPL-CCNC: 104 U/L (ref 45–117)
ALT SERPL-CCNC: 24 U/L (ref 13–56)
ANION GAP SERPL CALC-SCNC: 10 MMOL/L (ref 3–18)
AST SERPL-CCNC: 18 U/L (ref 15–37)
BASO+EOS+MONOS # BLD AUTO: 0.5 K/UL (ref 0–2.3)
BASO+EOS+MONOS # BLD AUTO: 8 % (ref 0.1–17)
BILIRUB SERPL-MCNC: 0.3 MG/DL (ref 0.2–1)
BUN SERPL-MCNC: 19 MG/DL (ref 7–18)
BUN/CREAT SERPL: 18 (ref 12–20)
CALCIUM SERPL-MCNC: 9.8 MG/DL (ref 8.5–10.1)
CHLORIDE SERPL-SCNC: 106 MMOL/L (ref 100–108)
CO2 SERPL-SCNC: 26 MMOL/L (ref 21–32)
CREAT SERPL-MCNC: 1.03 MG/DL (ref 0.6–1.3)
DIFFERENTIAL METHOD BLD: ABNORMAL
ERYTHROCYTE [DISTWIDTH] IN BLOOD BY AUTOMATED COUNT: 13.6 % (ref 11.5–14.5)
GLOBULIN SER CALC-MCNC: 3 G/DL (ref 2–4)
GLUCOSE SERPL-MCNC: 83 MG/DL (ref 74–99)
HCT VFR BLD AUTO: 36.1 % (ref 36–48)
HGB BLD-MCNC: 11.1 G/DL (ref 12–16)
IRON SATN MFR SERPL: 25 %
IRON SERPL-MCNC: 82 UG/DL (ref 50–175)
LYMPHOCYTES # BLD: 2.5 K/UL (ref 1.1–5.9)
LYMPHOCYTES NFR BLD: 44 % (ref 14–44)
MCH RBC QN AUTO: 25.2 PG (ref 25–35)
MCHC RBC AUTO-ENTMCNC: 30.7 G/DL (ref 31–37)
MCV RBC AUTO: 81.9 FL (ref 78–102)
NEUTS SEG # BLD: 2.7 K/UL (ref 1.8–9.5)
NEUTS SEG NFR BLD: 48 % (ref 40–70)
PLATELET # BLD AUTO: 387 K/UL (ref 140–440)
POTASSIUM SERPL-SCNC: 3.8 MMOL/L (ref 3.5–5.5)
PROT SERPL-MCNC: 7.3 G/DL (ref 6.4–8.2)
RBC # BLD AUTO: 4.41 M/UL (ref 4.1–5.1)
SODIUM SERPL-SCNC: 142 MMOL/L (ref 136–145)
TIBC SERPL-MCNC: 325 UG/DL (ref 250–450)
WBC # BLD AUTO: 5.7 K/UL (ref 4.5–13)

## 2018-01-23 PROCEDURE — 36415 COLL VENOUS BLD VENIPUNCTURE: CPT | Performed by: INTERNAL MEDICINE

## 2018-01-23 PROCEDURE — 83550 IRON BINDING TEST: CPT | Performed by: INTERNAL MEDICINE

## 2018-01-23 PROCEDURE — 80053 COMPREHEN METABOLIC PANEL: CPT | Performed by: INTERNAL MEDICINE

## 2018-01-23 PROCEDURE — 82728 ASSAY OF FERRITIN: CPT | Performed by: INTERNAL MEDICINE

## 2018-01-23 NOTE — PROGRESS NOTES
Hematology/Oncology  Progress Note    Name: Ferny Daniels  Date: 2018  : 1945    PCP: Felipa Saucedo MD     Ms. Madyson Jennings is a 81 y.o.  female who was seen for management of her reactive thrombocytosis and iron deficiency anemia. Current therapy: Oral iron supplement once daily. Subjective:     Mrs. Madyson Jennings is a 79-year-old woman who has a history of reactive thrombocytosis and iron deficiency anemia. She is doing well. She is continue to take the oral iron supplement once daily. She has no complaints of constipation or abdominal cramps. She denies having any blood in her stool or urine. Past medical history, family history, and social history: these were reviewed and remains unchanged. Past Medical History:   Diagnosis Date    Anemia     Arthritis     Diabetes ([address]     Type 2    H/O seasonal allergies     High cholesterol     Hypertension      Past Surgical History:   Procedure Laterality Date    COLONOSCOPY N/A 2017    COLONOSCOPY performed by Cheryl [facility] MD at [facility] ENDOSCOPY    HX CYST INCISION AND DRAINAGE Right     \"approx 30 years ago\"    HX HYSTERECTOMY      \"approx 30 years ago\"     Social History     Social History    Marital status:      Spouse name: N/A    Number of children: N/A    Years of education: N/A     Occupational History    Not on file.      Social History Main Topics    Smoking status: Never Smoker    Smokeless tobacco: Not on file    Alcohol use No    Drug use: No    Sexual activity: Not on file     Other Topics Concern    Not on file     Social History Narrative     Family History   Problem Relation Age of Onset    Diabetes Mother     Heart Disease Mother     Kidney Disease Mother     Cancer Father      h/o prostate cancer    Hypertension Father     Arthritis-osteo Father     Diabetes Sister     No Known Problems Brother     Diabetes Sister      Current Outpatient Prescriptions   Medication Sig Dispense Refill  metFORMIN (GLUCOPHAGE) 500 mg tablet Take  by mouth two (2) times daily (with meals).  losartan (COZAAR) 50 mg tablet Take 50 mg by mouth daily.  atorvastatin (LIPITOR) 40 mg tablet Take 40 mg by mouth nightly.  NIFEdipine ER (ADALAT CC) 60 mg ER tablet Take 60 mg by mouth daily.  ferrous sulfate 325 mg (65 mg iron) tablet Take  by mouth Daily (before breakfast).  fexofenadine (ALLEGRA) 180 mg tablet Take  by mouth daily as needed for Allergies.  meloxicam (MOBIC) 15 mg tablet Take 15 mg by mouth as needed for Pain. Review of Systems  Constitutional: The patient has no acute distress or discomfort. HEENT: The patient denies recent head trauma, eye pain, blurred vision,  hearing deficit, oropharyngeal mucosal pain or lesions, and the patient denies throat pain or discomfort. Lymphatics: The patient denies palpable peripheral lymphadenopathy. Hematologic: The patient denies having bruising, bleeding, or progressive fatigue. Respiratory: Patient denies having shortness of breath, cough, sputum production, fever, or dyspnea on exertion. Cardiovascular: The patient denies having leg pain, leg swelling, heart palpitations, chest permit, chest pain, or lightheadedness. The patient denies having dyspnea on exertion. Gastrointestinal: The patient denies having nausea, emesis, or diarrhea. The patient denies having any hematemesis or blood in the stool. Genitourinary: Patient denies having urinary urgency, frequency, or dysuria. The patient denies having blood in the urine. Psychological: The patient denies having symptoms of nervousness, anxiety, depression, or thoughts of harming self. Skin: Patient denies having skin rashes, skin, ulcerations, or unexplained itching or pruritus. Musculoskeletal: The patient denies having pain in the joints or bones.       Objective:     Visit Vitals    /70    Pulse 80    Temp 98.3 °F (36.8 °C)    Wt 81.2 kg (179 lb)    BMI 29.79 kg/m2     Physical Exam:   Gen. Appearance: The patient is in no acute distress. Skin: There is no bruise or rash. HEENT: The exam is unremarkable. Neck: Supple without lymphadenopathy or thyromegaly. Lungs: Clear to auscultation and percussion; there are no wheezes or rhonchi. Heart: Regular rate and rhythm; there are no murmurs, gallops, or rubs. Abdomen: Bowel sounds are present and normal.  There is no guarding, tenderness, or hepatosplenomegaly. Extremities: There is no clubbing, cyanosis, or edema. Neurologic: There are no focal neurologic deficits. Lymphatics: There is no palpable peripheral lymphadenopathy. Musculoskeletal: The patient has full range of motion at all joints. There is no evidence of joint deformity or effusions. There is no focal joint tenderness. Psychological/psychiatric: There is no clinical evidence of anxiety, depression, or melancholy. Lab data:      Results for orders placed or performed during the hospital encounter of 01/23/18   CBC WITH 3 PART DIFF     Status: Abnormal   Result Value Ref Range Status    WBC 5.7 4.5 - 13.0 K/uL Final    RBC 4.41 4.10 - 5.10 M/uL Final    HGB 11.1 (L) 12.0 - 16.0 g/dL Final    HCT 36.1 36 - 48 % Final    MCV 81.9 78 - 102 FL Final    MCH 25.2 25.0 - 35.0 PG Final    MCHC 30.7 (L) 31 - 37 g/dL Final    RDW 13.6 11.5 - 14.5 % Final    PLATELET 180 814 - 813 K/uL Final    NEUTROPHILS 48 40 - 70 % Final    MIXED CELLS 8 0.1 - 17 % Final    LYMPHOCYTES 44 14 - 44 % Final    ABS. NEUTROPHILS 2.7 1.8 - 9.5 K/UL Final    ABS. MIXED CELLS 0.5 0.0 - 2.3 K/uL Final    ABS. LYMPHOCYTES 2.5 1.1 - 5.9 K/UL Final     Comment: Test performed at [place] [address] Location. Results Reviewed by Medical Director. DF AUTOMATED   Final           Assessment:     1. Thrombocytosis ([address]    2.  Iron deficiency anemia secondary to inadequate dietary iron intake      Plan:   Reactive thrombocytosis: I have explained to the patient that her CBC from today shows that her platelet count is normal at 387,000. We will continue to monitor this at 3-4 month intervals. Iron deficiency anemia: The patient's hemoglobin is improving. The CBC from today shows that her hemoglobin is 11.1 g/dL with hematocrit 36.1%. The most recent iron studies from 10/24/2017 revealed that she had a ferritin level that is increased to 80 ng/mL with an iron saturation 35% and iron level of 105 mcg/dL. I have advised the patient to continue taking the oral iron supplement once daily. Follow-up in 3 months. Orders Placed This Encounter    COMPLETE CBC & AUTO DIFF WBC    InHouse CBC (My [employer])     Standing Status:   Future     Number of Occurrences:   1     Standing Expiration Date:   3/49/5374    METABOLIC PANEL, COMPREHENSIVE     Standing Status:   Future     Standing Expiration Date:   1/24/2019    IRON PROFILE     Standing Status:   Future     Standing Expiration Date:   1/24/2019    FERRITIN     Standing Status:   Future     Standing Expiration Date:   1/24/2019       Chad Holman MD  1/23/2018      Please note: This document has been produced using voice recognition software. Unrecognized errors in transcription may be present.

## 2018-01-23 NOTE — MR AVS SNAPSHOT
[address] [facility] 9938 [address] [facility] [address] [phone] Patient: Anita Alexis MRN: [id number] Visit Information Date & Time Provider Department Dept. Phone Encounter #  
 1/23/2018  2:45 PM Israel Alfonso 71 Office [phone] [id number] Follow-up Instructions Return in about 3 months (around 4/23/2018). Your Appointments 4/20/2018  1:30 PM  
Office Visit with MD Naa Alfonso 77 3651 [facility]) Appt Note: [address] 300 [facility] [address]  
[phone]  
  
   
 [place] [id number] Upcoming Health Maintenance Date Due Hepatitis C Screening 1945 DTaP/Tdap/Td series (1 - Tdap) 8/17/1968 BREAST CANCER SCRN MAMMOGRAM 8/17/1997 FOBT Q 1 YEAR AGE 50-75 8/17/1997 ZOSTER VACCINE AGE 60> 6/17/2007 GLAUCOMA SCREENING Q2Y 8/17/2012 Pneumococcal 65+ Low/Medium Risk (1 of 2 - PCV13) 8/17/2012 MEDICARE YEARLY EXAM 8/17/2012 Influenza Age 5 to Adult 8/1/2017 Allergies as of 1/23/2018  Review Complete On: 1/23/2018 By: Mihaela Gurrola MD  
  
 Severity Noted Reaction Type Reactions Lisinopril  01/11/2017    Cough Tape [Adhesive] Low 01/12/2017   Intolerance Rash Itching at site Current Immunizations  Never Reviewed No immunizations on file. Not reviewed this visit You Were Diagnosed With   
  
 Codes Comments Thrombocytosis ([facility]    -  Primary ICD-10-CM: D47.3 ICD-9-CM: 238.71 Iron deficiency anemia secondary to inadequate dietary iron intake     ICD-10-CM: D50.8 ICD-9-CM: 280.1 Vitals BP Pulse Temp Weight(growth percentile) BMI OB Status 141/70 80 98.3 °F (36.8 °C) 179 lb (81.2 kg) 29.79 kg/m2 Hysterectomy Smoking Status Never Smoker BMI and BSA Data Body Mass Index Body Surface Area  
 29.79 kg/m 2 1.93 m 2 Your Updated Medication List  
  
   
This list is accurate as of: 1/23/18  3:58 PM.  Always use your most recent med list.  
  
  
  
  
 atorvastatin 40 mg tablet Commonly known as:  LIPITOR Take 40 mg by mouth nightly. ferrous sulfate 325 mg (65 mg iron) tablet Take  by mouth Daily (before breakfast). fexofenadine 180 mg tablet Commonly known as:  Vernon Yoli Take  by mouth daily as needed for Allergies. losartan 50 mg tablet Commonly known as:  COZAAR Take 50 mg by mouth daily. meloxicam 15 mg tablet Commonly known as:  MOBIC Take 15 mg by mouth as needed for Pain.  
  
 metFORMIN 500 mg tablet Commonly known as:  GLUCOPHAGE Take  by mouth two (2) times daily (with meals). NIFEdipine ER 60 mg ER tablet Commonly known as:  ADALAT CC Take 60 mg by mouth daily. We Performed the Following COMPLETE CBC & AUTO DIFF WBC [76729 CPT(R)] Follow-up Instructions Return in about 3 months (around 4/23/2018). To-Do List   
 01/23/2018 Lab:  CBC WITH 3 PART DIFF Introducing [facility] & HEALTH SERVICES! [place] introduces [employer] patient portal. Now you can access parts of your medical record, email your doctor's office, and request medication refills online. 1. In your internet browser, go to [email or web address] 2. Click on the First Time User? Click Here link in the Sign In box. You will see the New Member Sign Up page. 3. Enter your InCast Access Code exactly as it appears below. You will not need to use this code after youve completed the sign-up process. If you do not sign up before the expiration date, you must request a new code. · InCast Access Code: [email or web address] Expires: 4/23/2018  3:58 PM 
 
4.  Enter the last four digits of your Social Security Number (xxxx) and Date of Birth (mm/dd/yyyy) as indicated and click Submit. You will be taken to the next sign-up page. 5. Create a Playrific ID. This will be your Play[employer] login ID and cannot be changed, so think of one that is secure and easy to remember. 6. Create a [employer] password. You can change your password at any time. 7. Enter your Password Reset Question and Answer. This can be used at a later time if you forget your password. 8. Enter your e-mail address. You will receive e-mail notification when new information is available in [address] 9. Click Sign Up. You can now view and download portions of your medical record. 10. Click the Download Summary menu link to download a portable copy of your medical information. If you have questions, please visit the Frequently Asked Questions section of the [employer] website. Remember, [employer] is NOT to be used for urgent needs. For medical emergencies, dial 911. Now available from your iPhone and Android! Please provide this summary of care documentation to your next provider. Your primary care clinician is listed as [address] [facility]. If you have any questions after today's visit, please call [phone].

## 2018-01-24 LAB — FERRITIN SERPL-MCNC: 86 NG/ML (ref 8–388)

## 2018-05-11 ENCOUNTER — HOSPITAL ENCOUNTER (OUTPATIENT)
Dept: LAB | Age: 73
Discharge: HOME | End: 2018-05-11
Payer: MEDICARE

## 2018-05-11 ENCOUNTER — HOSPITAL ENCOUNTER (OUTPATIENT)
Dept: ONCOLOGY | Age: 73
Discharge: HOME | End: 2018-05-11

## 2018-05-11 ENCOUNTER — OFFICE VISIT (OUTPATIENT)
Dept: ONCOLOGY | Age: 73
End: 2018-05-11

## 2018-05-11 VITALS
BODY MASS INDEX: 29.62 KG/M2 | SYSTOLIC BLOOD PRESSURE: 152 MMHG | HEART RATE: 89 BPM | WEIGHT: 178 LBS | TEMPERATURE: 98.5 F | DIASTOLIC BLOOD PRESSURE: 68 MMHG

## 2018-05-11 DIAGNOSIS — D50.8 IRON DEFICIENCY ANEMIA SECONDARY TO INADEQUATE DIETARY IRON INTAKE: Primary | ICD-10-CM

## 2018-05-11 DIAGNOSIS — D75.839 THROMBOCYTOSIS: ICD-10-CM

## 2018-05-11 DIAGNOSIS — D50.8 IRON DEFICIENCY ANEMIA SECONDARY TO INADEQUATE DIETARY IRON INTAKE: ICD-10-CM

## 2018-05-11 LAB
ALBUMIN SERPL-MCNC: 4 G/DL (ref 3.4–5)
ALBUMIN/GLOB SERPL: 1.2 {RATIO} (ref 0.8–1.7)
ALP SERPL-CCNC: 106 U/L (ref 45–117)
ALT SERPL-CCNC: 27 U/L (ref 13–56)
ANION GAP SERPL CALC-SCNC: 9 MMOL/L (ref 3–18)
AST SERPL-CCNC: 23 U/L (ref 15–37)
BASO+EOS+MONOS # BLD AUTO: 0.8 K/UL (ref 0–2.3)
BASO+EOS+MONOS # BLD AUTO: 11 % (ref 0.1–17)
BILIRUB SERPL-MCNC: 0.5 MG/DL (ref 0.2–1)
BUN SERPL-MCNC: 17 MG/DL (ref 7–18)
BUN/CREAT SERPL: 15 (ref 12–20)
CALCIUM SERPL-MCNC: 9.4 MG/DL (ref 8.5–10.1)
CHLORIDE SERPL-SCNC: 108 MMOL/L (ref 100–108)
CO2 SERPL-SCNC: 25 MMOL/L (ref 21–32)
CREAT SERPL-MCNC: 1.12 MG/DL (ref 0.6–1.3)
DIFFERENTIAL METHOD BLD: ABNORMAL
ERYTHROCYTE [DISTWIDTH] IN BLOOD BY AUTOMATED COUNT: 13.4 % (ref 11.5–14.5)
FERRITIN SERPL-MCNC: 144 NG/ML (ref 8–388)
GLOBULIN SER CALC-MCNC: 3.3 G/DL (ref 2–4)
GLUCOSE SERPL-MCNC: 84 MG/DL (ref 74–99)
HCT VFR BLD AUTO: 35.9 % (ref 36–48)
HGB BLD-MCNC: 11.2 G/DL (ref 12–16)
IRON SATN MFR SERPL: 41 %
IRON SERPL-MCNC: 117 UG/DL (ref 50–175)
LYMPHOCYTES # BLD: 3.5 K/UL (ref 1.1–5.9)
LYMPHOCYTES NFR BLD: 47 % (ref 14–44)
MCH RBC QN AUTO: 25.2 PG (ref 25–35)
MCHC RBC AUTO-ENTMCNC: 31.2 G/DL (ref 31–37)
MCV RBC AUTO: 80.7 FL (ref 78–102)
NEUTS SEG # BLD: 3.2 K/UL (ref 1.8–9.5)
NEUTS SEG NFR BLD: 42 % (ref 40–70)
PLATELET # BLD AUTO: 477 K/UL (ref 140–440)
POTASSIUM SERPL-SCNC: 4.1 MMOL/L (ref 3.5–5.5)
PROT SERPL-MCNC: 7.3 G/DL (ref 6.4–8.2)
RBC # BLD AUTO: 4.45 M/UL (ref 4.1–5.1)
SODIUM SERPL-SCNC: 142 MMOL/L (ref 136–145)
TIBC SERPL-MCNC: 286 UG/DL (ref 250–450)
WBC # BLD AUTO: 7.5 K/UL (ref 4.5–13)

## 2018-05-11 PROCEDURE — 80053 COMPREHEN METABOLIC PANEL: CPT | Performed by: INTERNAL MEDICINE

## 2018-05-11 PROCEDURE — 82728 ASSAY OF FERRITIN: CPT | Performed by: INTERNAL MEDICINE

## 2018-05-11 PROCEDURE — 83540 ASSAY OF IRON: CPT | Performed by: INTERNAL MEDICINE

## 2018-05-11 PROCEDURE — 36415 COLL VENOUS BLD VENIPUNCTURE: CPT | Performed by: INTERNAL MEDICINE

## 2018-05-11 NOTE — MR AVS SNAPSHOT
[address] [facility] [address] [facility] [address] 
[phone] Patient: Rosa M Santoro MRN: [id number] WNB:[birth year] Visit Information Date & Time Provider Department Dept. Phone Encounter #  
 5/11/2018  1:15 PM Israel Gallardo 73 Office [phone] [id number] Follow-up Instructions Return in about 3 months (around 8/11/2018). Your Appointments 8/14/2018  1:00 PM  
Office Visit with MD Naa Gallardo [address] [place] [address] [facility] Appt Note: [address] [facility] [address] [facility] [address]  
[phone]  
  
   
 [facility] [address] Upcoming Health Maintenance Date Due Hepatitis C Screening 1945 DTaP/Tdap/Td series (1 - Tdap) 8/17/1968 BREAST CANCER SCRN MAMMOGRAM 8/17/1997 FOBT Q 1 YEAR AGE 50-75 8/17/1997 ZOSTER VACCINE AGE 60> 6/17/2007 GLAUCOMA SCREENING Q2Y 8/17/2012 Pneumococcal 65+ Low/Medium Risk (1 of 2 - PCV13) 8/17/2012 MEDICARE YEARLY EXAM 3/14/2018 Influenza Age 5 to Adult 8/1/2018 Allergies as of 5/11/2018  Review Complete On: 5/11/2018 By: Odell Jesus MD  
  
 Severity Noted Reaction Type Reactions Lisinopril  01/11/2017    Cough Tape [Adhesive] Low 01/12/2017   Intolerance Rash Itching at site Current Immunizations  Never Reviewed No immunizations on file. Not reviewed this visit You Were Diagnosed With   
  
 Codes Comments Iron deficiency anemia secondary to inadequate dietary iron intake    -  Primary ICD-10-CM: D50.8 ICD-9-CM: 280. 1 Thrombocytosis ([address]     ICD-10-CM: D47.3 ICD-9-CM: 238.71 Vitals OB Status Smoking Status Hysterectomy Never Smoker Your Updated Medication List  
  
   
This list is accurate as of 5/11/18  1:42 PM.  Always use your most recent med list.  
  
  
  
  
 atorvastatin 40 mg tablet Commonly known as:  LIPITOR Take 40 mg by mouth nightly. ferrous sulfate 325 mg (65 mg iron) tablet Take  by mouth Daily (before breakfast). fexofenadine 180 mg tablet Commonly known as:  Bailey Lapidus Take  by mouth daily as needed for Allergies. losartan 50 mg tablet Commonly known as:  COZAAR Take 50 mg by mouth daily. meloxicam 15 mg tablet Commonly known as:  MOBIC Take 15 mg by mouth as needed for Pain.  
  
 metFORMIN 500 mg tablet Commonly known as:  GLUCOPHAGE Take  by mouth two (2) times daily (with meals). NIFEdipine ER 60 mg ER tablet Commonly known as:  ADALAT CC Take 60 mg by mouth daily. We Performed the Following COMPLETE CBC & AUTO DIFF WBC [71013 CPT(R)] Follow-up Instructions Return in about 3 months (around 8/11/2018). To-Do List   
 05/11/2018 Lab:  CBC WITH 3 PART DIFF Patient Instructions Iron Deficiency Anemia: Care Instructions Your Care Instructions Anemia means that you do not have enough red blood cells. Red blood cells carry oxygen around your body. When you have anemia, it can make you pale, weak, and tired. Many things can cause anemia. The most common cause is loss of blood. This can happen if you have heavy menstrual periods. It can also happen if you have bleeding in your stomach or bowel. You can also get anemia if you don't have enough iron in your diet or if it's hard for your body to absorb iron. In some cases, pregnancy causes anemia. That's because a pregnant woman needs more iron. Your doctor may do more tests to find the cause of your anemia. If a disease or other health problem is causing it, your doctor will treat that problem. It's important to follow up with your doctor to make sure that your iron level returns to normal. 
Follow-up care is a key part of your treatment and safety.  Be sure to make and go to all appointments, and call your doctor if you are having problems. It's also a good idea to know your test results and keep a list of the medicines you take. How can you care for yourself at home? · If your doctor recommended iron pills, take them as directed. ¨ Try to take the pills on an empty stomach. You can do this about 1 hour before or 2 hours after meals. But you may need to take iron with food to avoid an upset stomach. ¨ Do not take antacids or drink milk or anything with caffeine within 2 hours of when you take your iron. They can keep your body from absorbing the iron well. ¨ Vitamin C helps your body absorb iron. You may want to take iron pills with a glass of orange juice or some other food high in vitamin C. 
¨ Iron pills may cause stomach problems. These include heartburn, nausea, diarrhea, constipation, and cramps. It can help to drink plenty of fluids and include fruits, vegetables, and fiber in your diet. ¨ It's normal for iron pills to make your stool a greenish or grayish black. But internal bleeding can also cause dark stool. So it's important to tell your doctor about any color changes. ¨ Call your doctor if you think you are having a problem with your iron pills. Even after you start to feel better, it will take several months for your body to build up its supply of iron. ¨ If you miss a pill, don't take a double dose. ¨ Keep iron pills out of the reach of small children. Too much iron can be very dangerous. · Eat foods with a lot of iron. These include red meat, shellfish, poultry, and eggs. They also include beans, raisins, whole-grain bread, and leafy green vegetables. · Steam your vegetables. This is the best way to prepare them if you want to get as much iron as possible. · Be safe with medicines. Do not take nonsteroidal anti-inflammatory pain relievers unless your doctor tells you to. These include aspirin, naproxen (Aleve), and ibuprofen (Advil, Motrin). · Liquid iron can stain your teeth. But you can mix it with water or juice and drink it with a straw. Then it won't get on your teeth. When should you call for help? Call 911 anytime you think you may need emergency care. For example, call if: 
? · You passed out (lost consciousness). ?Call your doctor now or seek immediate medical care if: 
? · You are short of breath. ? · You are dizzy or light-headed, or you feel like you may faint. ? · You have new or worse bleeding. ? Watch closely for changes in your health, and be sure to contact your doctor if: 
? · You feel weaker or more tired than usual.  
? · You do not get better as expected. Where can you learn more? Go to http://zoraida-oscar.info/. Enter G478 in the search box to learn more about \"Iron Deficiency Anemia: Care Instructions. \" Current as of: October 13, 2016 Content Version: 11.4 © 2159-3243 [employer]. Care instructions adapted under license by [employer] (which disclaims liability or warranty for this information). If you have questions about a medical condition or this instruction, always ask your healthcare professional. [place] [address] any warranty or liability for your use of this information. Introducing [facility]! [employer] introduces [employer] patient portal. Now you can access parts of your medical record, email your doctor's office, and request medication refills online. 1. In your internet browser, go to [email or web address] 2. Click on the First Time User? Click Here link in the Sign In box. You will see the New Member Sign Up page. 3. Enter your [employer] Access Code exactly as it appears below. You will not need to use this code after youve completed the sign-up process. If you do not sign up before the expiration date, you must request a new code. · [employer] Access Code: [email or web address] Expires: 8/9/2018  1:42 PM 
 
 4. Enter the last four digits of your Social Security Number (xxxx) and Date of Birth (mm/dd/yyyy) as indicated and click Submit. You will be taken to the next sign-up page. 5. Create a [employer] ID. This will be your [employer] login ID and cannot be changed, so think of one that is secure and easy to remember. 6. Create a [employer] password. You can change your password at any time. 7. Enter your Password Reset Question and Answer. This can be used at a later time if you forget your password. 8. Enter your e-mail address. You will receive e-mail notification when new information is available in [address] 9. Click Sign Up. You can now view and download portions of your medical record. 10. Click the Download Summary menu link to download a portable copy of your medical information. If you have questions, please visit the Frequently Asked Questions section of the [employer] website. Remember, [employer] is NOT to be used for urgent needs. For medical emergencies, dial 911. Now available from your iPhone and Android! Please provide this summary of care documentation to your next provider. Your primary care clinician is listed as [address] [facility]. If you have any questions after today's visit, please call [phone].

## 2018-05-11 NOTE — PATIENT INSTRUCTIONS
Iron Deficiency Anemia: Care Instructions  Your Care Instructions    Anemia means that you do not have enough red blood cells. Red blood cells carry oxygen around your body. When you have anemia, it can make you pale, weak, and tired. Many things can cause anemia. The most common cause is loss of blood. This can happen if you have heavy menstrual periods. It can also happen if you have bleeding in your stomach or bowel. You can also get anemia if you don't have enough iron in your diet or if it's hard for your body to absorb iron. In some cases, pregnancy causes anemia. That's because a pregnant woman needs more iron. Your doctor may do more tests to find the cause of your anemia. If a disease or other health problem is causing it, your doctor will treat that problem. It's important to follow up with your doctor to make sure that your iron level returns to normal.  Follow-up care is a key part of your treatment and safety. Be sure to make and go to all appointments, and call your doctor if you are having problems. It's also a good idea to know your test results and keep a list of the medicines you take. How can you care for yourself at home? · If your doctor recommended iron pills, take them as directed. ¨ Try to take the pills on an empty stomach. You can do this about 1 hour before or 2 hours after meals. But you may need to take iron with food to avoid an upset stomach. ¨ Do not take antacids or drink milk or anything with caffeine within 2 hours of when you take your iron. They can keep your body from absorbing the iron well. ¨ Vitamin C helps your body absorb iron. You may want to take iron pills with a glass of orange juice or some other food high in vitamin C.  ¨ Iron pills may cause stomach problems. These include heartburn, nausea, diarrhea, constipation, and cramps. It can help to drink plenty of fluids and include fruits, vegetables, and fiber in your diet.   ¨ It's normal for iron pills to make your stool a greenish or grayish black. But internal bleeding can also cause dark stool. So it's important to tell your doctor about any color changes. ¨ Call your doctor if you think you are having a problem with your iron pills. Even after you start to feel better, it will take several months for your body to build up its supply of iron. ¨ If you miss a pill, don't take a double dose. ¨ Keep iron pills out of the reach of small children. Too much iron can be very dangerous. · Eat foods with a lot of iron. These include red meat, shellfish, poultry, and eggs. They also include beans, raisins, whole-grain bread, and leafy green vegetables. · Steam your vegetables. This is the best way to prepare them if you want to get as much iron as possible. · Be safe with medicines. Do not take nonsteroidal anti-inflammatory pain relievers unless your doctor tells you to. These include aspirin, naproxen (Aleve), and ibuprofen (Advil, Motrin). · Liquid iron can stain your teeth. But you can mix it with water or juice and drink it with a straw. Then it won't get on your teeth. When should you call for help? Call 911 anytime you think you may need emergency care. For example, call if:  ? · You passed out (lost consciousness). ?Call your doctor now or seek immediate medical care if:  ? · You are short of breath. ? · You are dizzy or light-headed, or you feel like you may faint. ? · You have new or worse bleeding. ? Watch closely for changes in your health, and be sure to contact your doctor if:  ? · You feel weaker or more tired than usual.   ? · You do not get better as expected. Where can you learn more? Go to http://zoraida-oscar.info/. Enter G515 in the search box to learn more about \"Iron Deficiency Anemia: Care Instructions. \"  Current as of: October 13, 2016  Content Version: 11.4  © 0639-1834 [employer], [employer].  Care instructions adapted under license by [employer] (which disclaims liability or warranty for this information). If you have questions about a medical condition or this instruction, always ask your healthcare professional. [address] any warranty or liability for your use of this information.

## 2018-05-11 NOTE — PROGRESS NOTES
Hematology/Oncology  Progress Note    Name: Hoiwe Gonzalez  Date: 2018  : 1945    PCP: Lacie Villalpando MD     Ms. Jayshree Beasley is a 81 y.o.  female who was seen for management of her reactive thrombocytosis and iron deficiency anemia. Current therapy: Oral iron supplement once daily. Subjective:     Mrs. Jayshree Beasley is a 72-year-old woman who has a history of reactive thrombocytosis and iron deficiency anemia. She is doing well. She is continue to take the oral iron supplement once daily. She has no complaints of constipation or abdominal cramps. She denies having any blood in her stool or urine. The patient overall reports that she is doing good however her stress level is high. Her  recently was discharged from the hospital and her mother-in-law has some serious medical issues as well. Past medical history, family history, and social history: these were reviewed and remains unchanged. Past Medical History:   Diagnosis Date    Anemia     Arthritis     Diabetes ([address]     Type 2    H/O seasonal allergies     High cholesterol     Hypertension      Past Surgical History:   Procedure Laterality Date    COLONOSCOPY N/A 2017    COLONOSCOPY performed by Tete Nye [facility] at [facility] ENDOSCOPY    HX CYST INCISION AND DRAINAGE Right     \"approx 30 years ago\"    HX HYSTERECTOMY      \"approx 30 years ago\"     Social History     Social History    Marital status:      Spouse name: N/A    Number of children: N/A    Years of education: N/A     Occupational History    Not on file.      Social History Main Topics    Smoking status: Never Smoker    Smokeless tobacco: Not on file    Alcohol use No    Drug use: No    Sexual activity: Not on file     Other Topics Concern    Not on file     Social History Narrative     Family History   Problem Relation Age of Onset    Diabetes Mother     Heart Disease Mother     Kidney Disease Mother     Cancer Father      h/o prostate cancer    Hypertension Father     Arthritis-osteo Father     Diabetes Sister     No Known Problems Brother     Diabetes Sister      Current Outpatient Prescriptions   Medication Sig Dispense Refill    metFORMIN (GLUCOPHAGE) 500 mg tablet Take  by mouth two (2) times daily (with meals).  losartan (COZAAR) 50 mg tablet Take 50 mg by mouth daily.  atorvastatin (LIPITOR) 40 mg tablet Take 40 mg by mouth nightly.  NIFEdipine ER (ADALAT CC) 60 mg ER tablet Take 60 mg by mouth daily.  ferrous sulfate 325 mg (65 mg iron) tablet Take  by mouth Daily (before breakfast).  fexofenadine (ALLEGRA) 180 mg tablet Take  by mouth daily as needed for Allergies.  meloxicam (MOBIC) 15 mg tablet Take 15 mg by mouth as needed for Pain. Review of Systems  Constitutional: The patient has no acute distress or discomfort. HEENT: The patient denies recent head trauma, eye pain, blurred vision,  hearing deficit, oropharyngeal mucosal pain or lesions, and the patient denies throat pain or discomfort. Lymphatics: The patient denies palpable peripheral lymphadenopathy. Hematologic: The patient denies having bruising, bleeding, or progressive fatigue. Respiratory: Patient denies having shortness of breath, cough, sputum production, fever, or dyspnea on exertion. Cardiovascular: The patient denies having leg pain, leg swelling, heart palpitations, chest permit, chest pain, or lightheadedness. The patient denies having dyspnea on exertion. Gastrointestinal: The patient denies having nausea, emesis, or diarrhea. The patient denies having any hematemesis or blood in the stool. Genitourinary: Patient denies having urinary urgency, frequency, or dysuria. The patient denies having blood in the urine. Psychological: The patient denies having symptoms of nervousness, anxiety, depression, or thoughts of harming self.   Skin: Patient denies having skin rashes, skin, ulcerations, or unexplained itching or pruritus. Musculoskeletal: The patient denies having pain in the joints or bones. Objective:     Visit Vitals    /68    Pulse 89    Temp 98.5 °F (36.9 °C)    Wt 80.7 kg (178 lb)    BMI 29.62 kg/m2     Physical Exam:   Gen. Appearance: The patient is in no acute distress. Skin: There is no bruise or rash. HEENT: The exam is unremarkable. Neck: Supple without lymphadenopathy or thyromegaly. Lungs: Clear to auscultation and percussion; there are no wheezes or rhonchi. Heart: Regular rate and rhythm; there are no murmurs, gallops, or rubs. Abdomen: Bowel sounds are present and normal.  There is no guarding, tenderness, or hepatosplenomegaly. Extremities: There is no clubbing, cyanosis, or edema. Neurologic: There are no focal neurologic deficits. Lymphatics: There is no palpable peripheral lymphadenopathy. Musculoskeletal: The patient has full range of motion at all joints. There is no evidence of joint deformity or effusions. There is no focal joint tenderness. Psychological/psychiatric: There is no clinical evidence of anxiety, depression, or melancholy. Lab data:      Results for orders placed or performed during the hospital encounter of 05/11/18   CBC WITH 3 PART DIFF     Status: Abnormal   Result Value Ref Range Status    WBC 7.5 4.5 - 13.0 K/uL Final    RBC 4.45 4.10 - 5.10 M/uL Final    HGB 11.2 (L) 12.0 - 16.0 g/dL Final    HCT 35.9 (L) 36 - 48 % Final    MCV 80.7 78 - 102 FL Final    MCH 25.2 25.0 - 35.0 PG Final    MCHC 31.2 31 - 37 g/dL Final    RDW 13.4 11.5 - 14.5 % Final    PLATELET 736 (H) 788 - 440 K/uL Final    NEUTROPHILS 42 40 - 70 % Final    MIXED CELLS 11 0.1 - 17 % Final    LYMPHOCYTES 47 (H) 14 - 44 % Final    ABS. NEUTROPHILS 3.2 1.8 - 9.5 K/UL Final    ABS. MIXED CELLS 0.8 0.0 - 2.3 K/uL Final    ABS. LYMPHOCYTES 3.5 1.1 - 5.9 K/UL Final     Comment: Test performed at [address] Results Reviewed by Medical Director.     DF AUTOMATED   Final           Assessment:     1. Iron deficiency anemia secondary to inadequate dietary iron intake    2. Thrombocytosis ([address]      Plan:   Reactive thrombocytosis: I have explained to the patient that her CBC from today shows that her platelet count is currently 477,000. We will continue to monitor this at 3-4 month intervals. Iron deficiency anemia: The patient's hemoglobin is improving. The CBC from today shows that her hemoglobin is 11.2 g/dL with hematocrit of 35.9 %. The most recent iron studies from 1/23/2018 showed a ferritin level of 86 ng/mL, iron level of 82 mcg/dL, iron saturation of 25%, BUN 19, and the creatinine was 1.03.. I have advised the patient to continue taking the oral iron supplement once daily. Follow-up in 3 months. Orders Placed This Encounter    COMPLETE CBC & AUTO DIFF WBC    METABOLIC PANEL, COMPREHENSIVE     Standing Status:   Future     Standing Expiration Date:   5/12/2019    IRON PROFILE     Standing Status:   Future     Standing Expiration Date:   5/12/2019    FERRITIN     Standing Status:   Future     Standing Expiration Date:   5/12/2019    InHouse CBC ([employer])     Standing Status:   Future     Number of Occurrences:   1     Standing Expiration Date:   5/18/2018       Ji Patel MD  5/11/2018      Please note: This document has been produced using voice recognition software. Unrecognized errors in transcription may be present.

## 2018-08-14 ENCOUNTER — OFFICE VISIT (OUTPATIENT)
Dept: ONCOLOGY | Age: 73
End: 2018-08-14

## 2018-08-14 ENCOUNTER — HOSPITAL ENCOUNTER (OUTPATIENT)
Dept: ONCOLOGY | Age: 73
Discharge: HOME | End: 2018-08-14

## 2018-08-14 ENCOUNTER — HOSPITAL ENCOUNTER (OUTPATIENT)
Dept: LAB | Age: 73
Discharge: HOME | End: 2018-08-14
Payer: MEDICARE

## 2018-08-14 VITALS
HEART RATE: 69 BPM | RESPIRATION RATE: 18 BRPM | SYSTOLIC BLOOD PRESSURE: 126 MMHG | WEIGHT: 181.8 LBS | DIASTOLIC BLOOD PRESSURE: 70 MMHG | BODY MASS INDEX: 30.25 KG/M2 | TEMPERATURE: 98.5 F

## 2018-08-14 DIAGNOSIS — D50.8 IRON DEFICIENCY ANEMIA SECONDARY TO INADEQUATE DIETARY IRON INTAKE: ICD-10-CM

## 2018-08-14 DIAGNOSIS — D75.839 THROMBOCYTOSIS: Primary | ICD-10-CM

## 2018-08-14 DIAGNOSIS — D75.839 THROMBOCYTOSIS: ICD-10-CM

## 2018-08-14 LAB
ALBUMIN SERPL-MCNC: 3.7 G/DL (ref 3.4–5)
ALBUMIN/GLOB SERPL: 1.2 {RATIO} (ref 0.8–1.7)
ALP SERPL-CCNC: 96 U/L (ref 45–117)
ALT SERPL-CCNC: 23 U/L (ref 13–56)
ANION GAP SERPL CALC-SCNC: 7 MMOL/L (ref 3–18)
AST SERPL-CCNC: 20 U/L (ref 15–37)
BASO+EOS+MONOS # BLD AUTO: 0.4 K/UL (ref 0–2.3)
BASO+EOS+MONOS # BLD AUTO: 8 % (ref 0.1–17)
BILIRUB SERPL-MCNC: 0.3 MG/DL (ref 0.2–1)
BUN SERPL-MCNC: 20 MG/DL (ref 7–18)
BUN/CREAT SERPL: 18 (ref 12–20)
CALCIUM SERPL-MCNC: 9.1 MG/DL (ref 8.5–10.1)
CHLORIDE SERPL-SCNC: 111 MMOL/L (ref 100–108)
CO2 SERPL-SCNC: 27 MMOL/L (ref 21–32)
CREAT SERPL-MCNC: 1.11 MG/DL (ref 0.6–1.3)
DIFFERENTIAL METHOD BLD: ABNORMAL
ERYTHROCYTE [DISTWIDTH] IN BLOOD BY AUTOMATED COUNT: 13.6 % (ref 11.5–14.5)
FERRITIN SERPL-MCNC: 112 NG/ML (ref 8–388)
GLOBULIN SER CALC-MCNC: 3.1 G/DL (ref 2–4)
GLUCOSE SERPL-MCNC: 118 MG/DL (ref 74–99)
HCT VFR BLD AUTO: 34.9 % (ref 36–48)
HGB BLD-MCNC: 10.6 G/DL (ref 12–16)
IRON SATN MFR SERPL: 31 %
IRON SERPL-MCNC: 86 UG/DL (ref 50–175)
LYMPHOCYTES # BLD: 1.9 K/UL (ref 1.1–5.9)
LYMPHOCYTES NFR BLD: 43 % (ref 14–44)
MCH RBC QN AUTO: 24.9 PG (ref 25–35)
MCHC RBC AUTO-ENTMCNC: 30.4 G/DL (ref 31–37)
MCV RBC AUTO: 82.1 FL (ref 78–102)
NEUTS SEG # BLD: 2.2 K/UL (ref 1.8–9.5)
NEUTS SEG NFR BLD: 49 % (ref 40–70)
PLATELET # BLD AUTO: 382 K/UL (ref 140–440)
POTASSIUM SERPL-SCNC: 4.1 MMOL/L (ref 3.5–5.5)
PROT SERPL-MCNC: 6.8 G/DL (ref 6.4–8.2)
RBC # BLD AUTO: 4.25 M/UL (ref 4.1–5.1)
SODIUM SERPL-SCNC: 145 MMOL/L (ref 136–145)
TIBC SERPL-MCNC: 274 UG/DL (ref 250–450)
WBC # BLD AUTO: 4.5 K/UL (ref 4.5–13)

## 2018-08-14 PROCEDURE — 83540 ASSAY OF IRON: CPT | Performed by: INTERNAL MEDICINE

## 2018-08-14 PROCEDURE — 82728 ASSAY OF FERRITIN: CPT | Performed by: INTERNAL MEDICINE

## 2018-08-14 PROCEDURE — 80053 COMPREHEN METABOLIC PANEL: CPT | Performed by: INTERNAL MEDICINE

## 2018-08-14 PROCEDURE — 36415 COLL VENOUS BLD VENIPUNCTURE: CPT | Performed by: INTERNAL MEDICINE

## 2018-08-14 NOTE — PROGRESS NOTES
Hematology/Oncology  Progress Note    Name: Bhavya Gordon  Date: 2018  : 1945    PCP: Quan Burrows MD     Ms. Barrie Schilder is a 81 y.o.  female who was seen for management of her reactive thrombocytosis and iron deficiency anemia. Current therapy: Oral iron supplement once daily. Subjective:     Mrs. Barrie Schilder is a 70-year-old woman who has a history of reactive thrombocytosis and iron deficiency anemia. She is doing well. She is continue to take the oral iron supplement once daily. She has no complaints of constipation or abdominal cramps. She denies having any blood in her stool or urine. The patient overall reports that she is doing good however her stress level is high. Past medical history, family history, and social history: these were reviewed and remains unchanged. Past Medical History:   Diagnosis Date    Anemia     Arthritis     Diabetes ([address]     Type 2    H/O seasonal allergies     High cholesterol     Hypertension      Past Surgical History:   Procedure Laterality Date    COLONOSCOPY N/A 2017    COLONOSCOPY performed by Lavelle Irizarry MD at [facility] ENDOSCOPY    HX CYST INCISION AND DRAINAGE Right     \"approx 30 years ago\"    HX HYSTERECTOMY      \"approx 30 years ago\"     Social History     Social History    Marital status:      Spouse name: N/A    Number of children: N/A    Years of education: N/A     Occupational History    Not on file.      Social History Main Topics    Smoking status: Never Smoker    Smokeless tobacco: Not on file    Alcohol use No    Drug use: No    Sexual activity: Not on file     Other Topics Concern    Not on file     Social History Narrative     Family History   Problem Relation Age of Onset    Diabetes Mother     Heart Disease Mother     Kidney Disease Mother     Cancer Father      h/o prostate cancer    Hypertension Father     Arthritis-osteo Father     Diabetes Sister     No Known Problems Brother     Diabetes Sister      Current Outpatient Prescriptions   Medication Sig Dispense Refill    metFORMIN (GLUCOPHAGE) 500 mg tablet Take  by mouth two (2) times daily (with meals).  losartan (COZAAR) 50 mg tablet Take 50 mg by mouth daily.  atorvastatin (LIPITOR) 40 mg tablet Take 40 mg by mouth nightly.  NIFEdipine ER (ADALAT CC) 60 mg ER tablet Take 60 mg by mouth daily.  ferrous sulfate 325 mg (65 mg iron) tablet Take  by mouth Daily (before breakfast).  fexofenadine (ALLEGRA) 180 mg tablet Take  by mouth daily as needed for Allergies.  meloxicam (MOBIC) 15 mg tablet Take 15 mg by mouth as needed for Pain. Review of Systems  Constitutional: The patient has no acute distress or discomfort. HEENT: The patient denies recent head trauma, eye pain, blurred vision,  hearing deficit, oropharyngeal mucosal pain or lesions, and the patient denies throat pain or discomfort. Lymphatics: The patient denies palpable peripheral lymphadenopathy. Hematologic: The patient denies having bruising, bleeding, or progressive fatigue. Respiratory: Patient denies having shortness of breath, cough, sputum production, fever, or dyspnea on exertion. Cardiovascular: The patient denies having leg pain, leg swelling, heart palpitations, chest permit, chest pain, or lightheadedness. The patient denies having dyspnea on exertion. Gastrointestinal: The patient denies having nausea, emesis, or diarrhea. The patient denies having any hematemesis or blood in the stool. Genitourinary: Patient denies having urinary urgency, frequency, or dysuria. The patient denies having blood in the urine. Psychological: The patient denies having symptoms of nervousness, anxiety, depression, or thoughts of harming self. Skin: Patient denies having skin rashes, skin, ulcerations, or unexplained itching or pruritus. Musculoskeletal: The patient denies having pain in the joints or bones.       Objective:     Visit Vitals    /70 (BP 1 Location: Left arm, BP Patient Position: Sitting)    Pulse 69    Temp 98.5 °F (36.9 °C) (Oral)    Resp 18    Wt 82.5 kg (181 lb 12.8 oz)    BMI 30.25 kg/m2     Physical Exam:   Gen. Appearance: The patient is in no acute distress. Skin: There is no bruise or rash. HEENT: The exam is unremarkable. Neck: Supple without lymphadenopathy or thyromegaly. Lungs: Clear to auscultation and percussion; there are no wheezes or rhonchi. Heart: Regular rate and rhythm; there are no murmurs, gallops, or rubs. Abdomen: Bowel sounds are present and normal.  There is no guarding, tenderness, or hepatosplenomegaly. Extremities: There is no clubbing, cyanosis, or edema. Neurologic: There are no focal neurologic deficits. Lymphatics: There is no palpable peripheral lymphadenopathy. Musculoskeletal: The patient has full range of motion at all joints. There is no evidence of joint deformity or effusions. There is no focal joint tenderness. Psychological/psychiatric: There is no clinical evidence of anxiety, depression, or melancholy. Lab data:      Results for orders placed or performed during the hospital encounter of 08/14/18   CBC WITH 3 PART DIFF     Status: Abnormal   Result Value Ref Range Status    WBC 4.5 4.5 - 13.0 K/uL Final    RBC 4.25 4. 10 - 5.10 M/uL Final    HGB 10.6 (L) 12.0 - 16.0 g/dL Final    HCT 34.9 (L) 36 - 48 % Final    MCV 82.1 78 - 102 FL Final    MCH 24.9 (L) 25.0 - 35.0 PG Final    MCHC 30.4 (L) 31 - 37 g/dL Final    RDW 13.6 11.5 - 14.5 % Final    PLATELET 006 338 - 676 K/uL Final    NEUTROPHILS 49 40 - 70 % Final    MIXED CELLS 8 0.1 - 17 % Final    LYMPHOCYTES 43 14 - 44 % Final    ABS. NEUTROPHILS 2.2 1.8 - 9.5 K/UL Final    ABS. MIXED CELLS 0.4 0.0 - 2.3 K/uL Final    ABS. LYMPHOCYTES 1.9 1.1 - 5.9 K/UL Final     Comment: Test performed at [place] [address] Location. Results Reviewed by Medical Director. DF AUTOMATED   Final           Assessment:     1. Thrombocytosis ([facility] [address]    2. Iron deficiency anemia secondary to inadequate dietary iron intake      Plan:   Reactive thrombocytosis: I have explained to the patient that her CBC from today shows that her platelet count is currently 382,000. We will continue to monitor this at 3-4 month intervals. Iron deficiency anemia: The patient's hemoglobin is improving. The CBC from today shows that her hemoglobin is 10.6 g/dL with hematocrit of 34.9 %. The most recent iron studies from 5/11/2018 showed a ferritin level of 144 ng/mL, iron level of 82 mcg/dL, iron saturation of 41 % and the BUN and creatinine were normal . I have advised the patient to continue taking the oral iron supplement once daily. Follow-up in 3 months. Orders Placed This Encounter    COMPLETE CBC & AUTO DIFF WBC    InHouse CBC ([facility])     Standing Status:   Future     Number of Occurrences:   1     Standing Expiration Date:   8/21/2018       Aleyda Garcia MD  8/14/2018      Please note: This document has been produced using voice recognition software. Unrecognized errors in transcription may be present.

## 2018-08-14 NOTE — MR AVS SNAPSHOT
[address] [facility] [address] [facility] [address] 
[phone] Patient: Kaylin Booth MRN: [id number] RER:6/17[id number] Visit Information Date & Time Provider Department Dept. Phone Encounter #  
 8/14/2018  1:00 PM Nohelia Salazar MD [facility]  [phone] [id number] Follow-up Instructions Return in about 3 months (around 11/14/2018). Your Appointments 11/13/2018  1:00 PM  
Office Visit with MD Gerald [facility]  [facility]) Appt Note: [facility] [address] [facility] [address]  
[phone]  
  
   
 [facility] [address] Upcoming Health Maintenance Date Due Hepatitis C Screening 1945 DTaP/Tdap/Td series (1 - Tdap) 8/17/1968 BREAST CANCER SCRN MAMMOGRAM 8/17/1997 FOBT Q 1 YEAR AGE 50-75 8/17/1997 ZOSTER VACCINE AGE 60> 6/17/2007 GLAUCOMA SCREENING Q2Y 8/17/2012 Pneumococcal 65+ Low/Medium Risk (1 of 2 - PCV13) 8/17/2012 MEDICARE YEARLY EXAM 3/14/2018 Influenza Age 5 to Adult 8/1/2018 Allergies as of 8/14/2018  Review Complete On: 8/14/2018 By: Nohelia Salazar MD  
  
 Severity Noted Reaction Type Reactions Lisinopril  01/11/2017    Cough Tape [Adhesive] Low 01/12/2017   Intolerance Rash Itching at site Current Immunizations  Never Reviewed No immunizations on file. Not reviewed this visit You Were Diagnosed With   
  
 Codes Comments Thrombocytosis ([facility] [address]    -  Primary ICD-10-CM: D47.3 ICD-9-CM: 238.71 Iron deficiency anemia secondary to inadequate dietary iron intake     ICD-10-CM: D50.8 ICD-9-CM: 280.1 Vitals  BP Pulse Temp Resp Weight(growth percentile) BMI  
 126/70 (BP 1 Location: Left arm, BP Patient Position: Sitting) 69 98.5 °F (36.9 °C) (Oral) 18 181 lb 12.8 oz (82.5 kg) 30.25 kg/m2 OB Status Smoking Status Hysterectomy Never Smoker BMI and BSA Data Body Mass Index Body Surface Area  
 30.25 kg/m 2 1.95 m 2 Your Updated Medication List  
  
   
This list is accurate as of 8/14/18  1:51 PM.  Always use your most recent med list.  
  
  
  
  
 atorvastatin 40 mg tablet Commonly known as:  LIPITOR Take 40 mg by mouth nightly. ferrous sulfate 325 mg (65 mg iron) tablet Take  by mouth Daily (before breakfast). fexofenadine 180 mg tablet Commonly known as:  Frandy Reinier Take  by mouth daily as needed for Allergies. losartan 50 mg tablet Commonly known as:  COZAAR Take 50 mg by mouth daily. meloxicam 15 mg tablet Commonly known as:  MOBIC Take 15 mg by mouth as needed for Pain.  
  
 metFORMIN 500 mg tablet Commonly known as:  GLUCOPHAGE Take  by mouth two (2) times daily (with meals). NIFEdipine ER 60 mg ER tablet Commonly known as:  ADALAT CC Take 60 mg by mouth daily. We Performed the Following COMPLETE CBC & AUTO DIFF WBC [11852 CPT(R)] Follow-up Instructions Return in about 3 months (around 11/14/2018). To-Do List   
 08/14/2018 Lab:  CBC WITH 3 PART DIFF   
  
 08/14/2018 Lab:  FERRITIN   
  
 08/14/2018 Lab:  IRON PROFILE   
  
 08/14/2018 Lab:  METABOLIC PANEL, COMPREHENSIVE Patient Instructions Complete Blood Count (CBC): About This Test 
What is it? A complete blood count (CBC) is a blood test that gives important information about your blood cells, especially red blood cells, white blood cells, and platelets. Why is this test done? A CBC may be done as part of a regular physical exam. There are many other reasons that a doctor may want this blood test, including to: · Find the cause of symptoms such as fatigue, weakness, fever, bruising, or weight loss. · Find anemia or an infection. · See how much blood has been lost if there is bleeding. · Diagnose diseases of the blood, such as leukemia or polycythemia. How can you prepare for the test? 
You do not need to do anything before having this test. 
What happens during the test? 
The health professional taking a sample of your blood will: · Wrap an elastic band around your upper arm. This makes the veins below the band larger so it is easier to put a needle into the vein. · Clean the needle site with alcohol. · Put the needle into the vein. · Attach a tube to the needle to fill it with blood. · Remove the band from your arm when enough blood is collected. · Put a gauze pad or cotton ball over the needle site as the needle is removed. · Put pressure on the site and then put on a bandage. If this blood test is done on a baby, a heel stick may be done instead of a blood draw from a vein. What happens after the test? 
· You will probably be able to go home right away. · You can go back to your usual activities right away. Follow-up care is a key part of your treatment and safety. Be sure to make and go to all appointments, and call your doctor if you are having problems. It's also a good idea to keep a list of the medicines you take. Ask your doctor when you can expect to have your test results. Where can you learn more? Go to http://zoraida-oscar.info/. Enter U516 in the search box to learn more about \"Complete Blood Count (CBC): About This Test.\" Current as of: October 9, 2017 Content Version: 11.7 © 9265-8121 [employer]. Care instructions adapted under license by [employer] (which disclaims liability or warranty for this information). If you have questions about a medical condition or this instruction, always ask your healthcare professional. [address] any warranty or liability for your use of this information. Introducing [facility]! [employer] introduces Sunsea patient portal. Now you can access parts of your medical record, email your doctor's office, and request medication refills online. 1. In your internet browser, go to [email or web address] 2. Click on the First Time User? Click Here link in the Sign In box. You will see the New Member Sign Up page. 3. Enter your Sunsea Access Code exactly as it appears below. You will not need to use this code after youve completed the sign-up process. If you do not sign up before the expiration date, you must request a new code. · Sunsea Access Code: [email or web address] Expires: 11/12/2018  1:51 PM 
 
4. Enter the last four digits of your Social Security Number (xxxx) and Date of Birth (mm/dd/yyyy) as indicated and click Submit. You will be taken to the next sign-up page. 5. Create a Sunsea ID. This will be your Sunsea login ID and cannot be changed, so think of one that is secure and easy to remember. 6. Create a Sunsea password. You can change your password at any time. 7. Enter your Password Reset Question and Answer. This can be used at a later time if you forget your password. 8. Enter your e-mail address. You will receive e-mail notification when new information is available in [address] 9. Click Sign Up. You can now view and download portions of your medical record. 10. Click the Download Summary menu link to download a portable copy of your medical information. If you have questions, please visit the Frequently Asked Questions section of the Sunsea website. Remember, Sunsea is NOT to be used for urgent needs. For medical emergencies, dial 911. Now available from your iPhone and Android! Please provide this summary of care documentation to your next provider. Your primary care clinician is listed as [address]. If you have any questions after today's visit, please call [phone].

## 2018-08-14 NOTE — PATIENT INSTRUCTIONS
Complete Blood Count (CBC): About This Test  What is it? A complete blood count (CBC) is a blood test that gives important information about your blood cells, especially red blood cells, white blood cells, and platelets. Why is this test done? A CBC may be done as part of a regular physical exam. There are many other reasons that a doctor may want this blood test, including to:  · Find the cause of symptoms such as fatigue, weakness, fever, bruising, or weight loss. · Find anemia or an infection. · See how much blood has been lost if there is bleeding. · Diagnose diseases of the blood, such as leukemia or polycythemia. How can you prepare for the test?  You do not need to do anything before having this test.  What happens during the test?  The health professional taking a sample of your blood will:  · Wrap an elastic band around your upper arm. This makes the veins below the band larger so it is easier to put a needle into the vein. · Clean the needle site with alcohol. · Put the needle into the vein. · Attach a tube to the needle to fill it with blood. · Remove the band from your arm when enough blood is collected. · Put a gauze pad or cotton ball over the needle site as the needle is removed. · Put pressure on the site and then put on a bandage. If this blood test is done on a baby, a heel stick may be done instead of a blood draw from a vein. What happens after the test?  · You will probably be able to go home right away. · You can go back to your usual activities right away. Follow-up care is a key part of your treatment and safety. Be sure to make and go to all appointments, and call your doctor if you are having problems. It's also a good idea to keep a list of the medicines you take. Ask your doctor when you can expect to have your test results. Where can you learn more? Go to http://zoraida-oscar.info/.   Enter F618 in the search box to learn more about \"Complete Blood Count (CBC): About This Test.\"  Current as of: October 9, 2017  Content Version: 11.7  © 8683-6286 [employer]. Care instructions adapted under license by [employer] (which disclaims liability or warranty for this information). If you have questions about a medical condition or this instruction, always ask your healthcare professional. [address] any warranty or liability for your use of this information.

## 2018-11-13 ENCOUNTER — OFFICE VISIT (OUTPATIENT)
Dept: ONCOLOGY | Age: 73
End: 2018-11-13

## 2018-11-13 ENCOUNTER — HOSPITAL ENCOUNTER (OUTPATIENT)
Dept: ONCOLOGY | Age: 73
Discharge: HOME | End: 2018-11-13

## 2018-11-13 ENCOUNTER — HOSPITAL ENCOUNTER (OUTPATIENT)
Dept: LAB | Age: 73
Discharge: HOME | End: 2018-11-13
Payer: MEDICARE

## 2018-11-13 VITALS
HEIGHT: 65 IN | DIASTOLIC BLOOD PRESSURE: 69 MMHG | TEMPERATURE: 98.5 F | BODY MASS INDEX: 31.39 KG/M2 | WEIGHT: 188.4 LBS | SYSTOLIC BLOOD PRESSURE: 142 MMHG | HEART RATE: 74 BPM | RESPIRATION RATE: 16 BRPM

## 2018-11-13 DIAGNOSIS — D75.839 THROMBOCYTOSIS: ICD-10-CM

## 2018-11-13 DIAGNOSIS — D50.8 IRON DEFICIENCY ANEMIA SECONDARY TO INADEQUATE DIETARY IRON INTAKE: ICD-10-CM

## 2018-11-13 DIAGNOSIS — D50.8 IRON DEFICIENCY ANEMIA SECONDARY TO INADEQUATE DIETARY IRON INTAKE: Primary | ICD-10-CM

## 2018-11-13 LAB
ALBUMIN SERPL-MCNC: 3.6 G/DL (ref 3.4–5)
ALBUMIN/GLOB SERPL: 1.2 {RATIO} (ref 0.8–1.7)
ALP SERPL-CCNC: 104 U/L (ref 45–117)
ALT SERPL-CCNC: 25 U/L (ref 13–56)
ANION GAP SERPL CALC-SCNC: 8 MMOL/L (ref 3–18)
AST SERPL-CCNC: 20 U/L (ref 15–37)
BASO+EOS+MONOS # BLD AUTO: 0.6 K/UL (ref 0–2.3)
BASO+EOS+MONOS # BLD AUTO: 9 % (ref 0.1–17)
BILIRUB SERPL-MCNC: 0.3 MG/DL (ref 0.2–1)
BUN SERPL-MCNC: 18 MG/DL (ref 7–18)
BUN/CREAT SERPL: 15 (ref 12–20)
CALCIUM SERPL-MCNC: 8.6 MG/DL (ref 8.5–10.1)
CHLORIDE SERPL-SCNC: 108 MMOL/L (ref 100–108)
CO2 SERPL-SCNC: 27 MMOL/L (ref 21–32)
CREAT SERPL-MCNC: 1.22 MG/DL (ref 0.6–1.3)
DIFFERENTIAL METHOD BLD: ABNORMAL
ERYTHROCYTE [DISTWIDTH] IN BLOOD BY AUTOMATED COUNT: 13.5 % (ref 11.5–14.5)
FERRITIN SERPL-MCNC: 109 NG/ML (ref 8–388)
GLOBULIN SER CALC-MCNC: 3 G/DL (ref 2–4)
GLUCOSE SERPL-MCNC: 101 MG/DL (ref 74–99)
HCT VFR BLD AUTO: 35.6 % (ref 36–48)
HGB BLD-MCNC: 11 G/DL (ref 12–16)
IRON SATN MFR SERPL: 22 %
IRON SERPL-MCNC: 60 UG/DL (ref 50–175)
LYMPHOCYTES # BLD: 2.4 K/UL (ref 1.1–5.9)
LYMPHOCYTES NFR BLD: 36 % (ref 14–44)
MCH RBC QN AUTO: 25.2 PG (ref 25–35)
MCHC RBC AUTO-ENTMCNC: 30.9 G/DL (ref 31–37)
MCV RBC AUTO: 81.5 FL (ref 78–102)
NEUTS SEG # BLD: 3.6 K/UL (ref 1.8–9.5)
NEUTS SEG NFR BLD: 55 % (ref 40–70)
PLATELET # BLD AUTO: 369 K/UL (ref 140–440)
POTASSIUM SERPL-SCNC: 3.9 MMOL/L (ref 3.5–5.5)
PROT SERPL-MCNC: 6.6 G/DL (ref 6.4–8.2)
RBC # BLD AUTO: 4.37 M/UL (ref 4.1–5.1)
SODIUM SERPL-SCNC: 143 MMOL/L (ref 136–145)
TIBC SERPL-MCNC: 272 UG/DL (ref 250–450)
WBC # BLD AUTO: 6.6 K/UL (ref 4.5–13)

## 2018-11-13 PROCEDURE — 36415 COLL VENOUS BLD VENIPUNCTURE: CPT

## 2018-11-13 PROCEDURE — 83540 ASSAY OF IRON: CPT

## 2018-11-13 PROCEDURE — 80053 COMPREHEN METABOLIC PANEL: CPT

## 2018-11-13 PROCEDURE — 82728 ASSAY OF FERRITIN: CPT

## 2018-11-13 NOTE — PATIENT INSTRUCTIONS
Iron Deficiency Anemia: Care Instructions  Your Care Instructions    Anemia means that you do not have enough red blood cells. Red blood cells carry oxygen around your body. When you have anemia, it can make you pale, weak, and tired. Many things can cause anemia. The most common cause is loss of blood. This can happen if you have heavy menstrual periods. It can also happen if you have bleeding in your stomach or bowel. You can also get anemia if you don't have enough iron in your diet or if it's hard for your body to absorb iron. In some cases, pregnancy causes anemia. That's because a pregnant woman needs more iron. Your doctor may do more tests to find the cause of your anemia. If a disease or other health problem is causing it, your doctor will treat that problem. It's important to follow up with your doctor to make sure that your iron level returns to normal.  Follow-up care is a key part of your treatment and safety. Be sure to make and go to all appointments, and call your doctor if you are having problems. It's also a good idea to know your test results and keep a list of the medicines you take. How can you care for yourself at home? · If your doctor recommended iron pills, take them as directed. ? Try to take the pills on an empty stomach. You can do this about 1 hour before or 2 hours after meals. But you may need to take iron with food to avoid an upset stomach. ? Do not take antacids or drink milk or anything with caffeine within 2 hours of when you take your iron. They can keep your body from absorbing the iron well. ? Vitamin C helps your body absorb iron. You may want to take iron pills with a glass of orange juice or some other food high in vitamin C.  ? Iron pills may cause stomach problems. These include heartburn, nausea, diarrhea, constipation, and cramps. It can help to drink plenty of fluids and include fruits, vegetables, and fiber in your diet.   ? It's normal for iron pills to make your stool a greenish or grayish black. But internal bleeding can also cause dark stool. So it's important to tell your doctor about any color changes. ? Call your doctor if you think you are having a problem with your iron pills. Even after you start to feel better, it will take several months for your body to build up its supply of iron. ? If you miss a pill, don't take a double dose. ? Keep iron pills out of the reach of small children. Too much iron can be very dangerous. · Eat foods with a lot of iron. These include red meat, shellfish, poultry, and eggs. They also include beans, raisins, whole-grain bread, and leafy green vegetables. · Steam your vegetables. This is the best way to prepare them if you want to get as much iron as possible. · Be safe with medicines. Do not take nonsteroidal anti-inflammatory pain relievers unless your doctor tells you to. These include aspirin, naproxen (Aleve), and ibuprofen (Advil, Motrin). · Liquid iron can stain your teeth. But you can mix it with water or juice and drink it with a straw. Then it won't get on your teeth. When should you call for help? Call 911 anytime you think you may need emergency care. For example, call if:    · You passed out (lost consciousness).    Call your doctor now or seek immediate medical care if:    · You are short of breath.     · You are dizzy or light-headed, or you feel like you may faint.     · You have new or worse bleeding.    Watch closely for changes in your health, and be sure to contact your doctor if:    · You feel weaker or more tired than usual.     · You do not get better as expected. Where can you learn more? Go to http://zoraida-oscar.info/. Enter L125 in the search box to learn more about \"Iron Deficiency Anemia: Care Instructions. \"  Current as of: May 7, 2018  Content Version: 11.8  © 9675-0471 [employer].  Care instructions adapted under license by [employer] (which disclaims liability or warranty for this information). If you have questions about a medical condition or this instruction, always ask your healthcare professional. [address] any warranty or liability for your use of this information.

## 2018-11-13 NOTE — PROGRESS NOTES
Hematology/Oncology  Progress Note    Name: Elsie Spicer  Date: 2018  : 1945    PCP: Allen Kirk MD     Ms. Liz Teixeira is a 72 y.o.  female who was seen for management of her reactive thrombocytosis and iron deficiency anemia. Current therapy: Oral iron supplement once daily. Subjective:     Mrs. Liz Teixeira is a 57-year-old woman who has a history of reactive thrombocytosis and iron deficiency anemia. She is doing well. She is continue to take the oral iron supplement once daily. She has no complaints of constipation or abdominal cramps. She denies having any blood in her stool or urine. The patient overall reports that she is doing good however her stress level is high. Past medical history, family history, and social history: these were reviewed and remains unchanged.     Past Medical History:   Diagnosis Date    Anemia     Arthritis     Diabetes ([address]     Type 2    H/O seasonal allergies     High cholesterol     Hypertension      Past Surgical History:   Procedure Laterality Date    HX CYST INCISION AND DRAINAGE Right     \"approx 30 years ago\"    HX HYSTERECTOMY      \"approx 30 years ago\"     Social History     Socioeconomic History    Marital status:      Spouse name: Not on file    Number of children: Not on file    Years of education: Not on file    Highest education level: Not on file   Social Needs    Financial resource strain: Not on file    Food insecurity - worry: Not on file    Food insecurity - inability: Not on file   [employer] needs - medical: Not on file   [employer] needs - non-medical: Not on file   Occupational History    Not on file   Tobacco Use    Smoking status: Never Smoker   Substance and Sexual Activity    Alcohol use: No    Drug use: No    Sexual activity: Not on file   Other Topics Concern    Not on file   Social History Narrative    Not on file     Family History   Problem Relation Age of Onset    Diabetes Mother     Heart Disease Mother     Kidney Disease Mother     Cancer Father         h/o prostate cancer    Hypertension Father     Arthritis-osteo Father     Diabetes Sister     No Known Problems Brother     Diabetes Sister      Current Outpatient Medications   Medication Sig Dispense Refill    metFORMIN (GLUCOPHAGE) 500 mg tablet Take  by mouth two (2) times daily (with meals).  losartan (COZAAR) 50 mg tablet Take 50 mg by mouth daily.  atorvastatin (LIPITOR) 40 mg tablet Take 40 mg by mouth nightly.  NIFEdipine ER (ADALAT CC) 60 mg ER tablet Take 60 mg by mouth daily.  ferrous sulfate 325 mg (65 mg iron) tablet Take  by mouth Daily (before breakfast).  fexofenadine (ALLEGRA) 180 mg tablet Take  by mouth daily as needed for Allergies.  meloxicam (MOBIC) 15 mg tablet Take 15 mg by mouth as needed for Pain. Review of Systems  Constitutional: The patient has no acute distress or discomfort. HEENT: The patient denies recent head trauma, eye pain, blurred vision,  hearing deficit, oropharyngeal mucosal pain or lesions, and the patient denies throat pain or discomfort. Lymphatics: The patient denies palpable peripheral lymphadenopathy. Hematologic: The patient denies having bruising, bleeding, or progressive fatigue. Respiratory: Patient denies having shortness of breath, cough, sputum production, fever, or dyspnea on exertion. Cardiovascular: The patient denies having leg pain, leg swelling, heart palpitations, chest permit, chest pain, or lightheadedness. The patient denies having dyspnea on exertion. Gastrointestinal: The patient denies having nausea, emesis, or diarrhea. The patient denies having any hematemesis or blood in the stool. Genitourinary: Patient denies having urinary urgency, frequency, or dysuria. The patient denies having blood in the urine.   Psychological: The patient denies having symptoms of nervousness, anxiety, depression, or thoughts of harming self.  Skin: Patient denies having skin rashes, skin, ulcerations, or unexplained itching or pruritus. Musculoskeletal: The patient denies having pain in the joints or bones. Objective:     Visit Vitals  /69   Pulse 74   Temp 98.5 °F (36.9 °C) (Oral)   Resp 16   Ht 5' 5\" (1.651 m)   Wt 85.5 kg (188 lb 6.4 oz)   BMI 31.35 kg/m²     Physical Exam:   Gen. Appearance: The patient is in no acute distress. Skin: There is no bruise or rash. HEENT: The exam is unremarkable. Neck: Supple without lymphadenopathy or thyromegaly. Lungs: Clear to auscultation and percussion; there are no wheezes or rhonchi. Heart: Regular rate and rhythm; there are no murmurs, gallops, or rubs. Abdomen: Bowel sounds are present and normal.  There is no guarding, tenderness, or hepatosplenomegaly. Extremities: There is no clubbing, cyanosis, or edema. Neurologic: There are no focal neurologic deficits. Lymphatics: There is no palpable peripheral lymphadenopathy. Musculoskeletal: The patient has full range of motion at all joints. There is no evidence of joint deformity or effusions. There is no focal joint tenderness. Psychological/psychiatric: There is no clinical evidence of anxiety, depression, or melancholy. Lab data:      Results for orders placed or performed during the hospital encounter of 11/13/18   CBC WITH 3 PART DIFF     Status: Abnormal   Result Value Ref Range Status    WBC 6.6 4.5 - 13.0 K/uL Final    RBC 4.37 4.10 - 5.10 M/uL Final    HGB 11.0 (L) 12.0 - 16.0 g/dL Final    HCT 35.6 (L) 36 - 48 % Final    MCV 81.5 78 - 102 FL Final    MCH 25.2 25.0 - 35.0 PG Final    MCHC 30.9 (L) 31 - 37 g/dL Final    RDW 13.5 11.5 - 14.5 % Final    PLATELET 145 170 - 237 K/uL Final    NEUTROPHILS 55 40 - 70 % Final    MIXED CELLS 9 0.1 - 17 % Final    LYMPHOCYTES 36 14 - 44 % Final    ABS. NEUTROPHILS 3.6 1.8 - 9.5 K/UL Final    ABS. MIXED CELLS 0.6 0.0 - 2.3 K/uL Final    ABS.  LYMPHOCYTES 2.4 1.1 - 5.9 K/UL Final Comment: Test performed at [place] [address] Location. Results Reviewed by Medical Director. DF AUTOMATED   Final           Assessment:     1. Iron deficiency anemia secondary to inadequate dietary iron intake    2. Thrombocytosis ([address]      Plan:   Reactive thrombocytosis: I have explained to the patient that her CBC from today shows that her platelet count is currently normal at 369,000. We will continue to monitor this at 3-4 month intervals. Iron deficiency anemia: The patient's hemoglobin is improving. The CBC from today shows that her hemoglobin is 11 g/dL with hematocrit of 35.6 %. The most recent iron studies from 5/11/2018 showed a ferritin level of 144 ng/mL, iron level of 82 mcg/dL, iron saturation of 41 % and the BUN and creatinine were normal . I have advised the patient to continue taking the oral iron supplement once daily. Follow-up in 3 months. Orders Placed This Encounter    COMPLETE CBC & AUTO DIFF WBC    METABOLIC PANEL, COMPREHENSIVE     Standing Status:   Future     Standing Expiration Date:   11/14/2019    IRON PROFILE     Standing Status:   Future     Standing Expiration Date:   11/14/2019    FERRITIN     Standing Status:   Future     Standing Expiration Date:   11/14/2019    InHouse CBC ([employer])     Standing Status:   Future     Number of Occurrences:   1     Standing Expiration Date:   11/20/2018       Bard Mayra MD  11/13/2018      Please note: This document has been produced using voice recognition software. Unrecognized errors in transcription may be present.

## 2019-02-15 ENCOUNTER — HOSPITAL ENCOUNTER (OUTPATIENT)
Dept: LAB | Age: 74
Discharge: HOME | End: 2019-02-15
Payer: MEDICARE

## 2019-02-15 ENCOUNTER — HOSPITAL ENCOUNTER (OUTPATIENT)
Dept: ONCOLOGY | Age: 74
Discharge: HOME | End: 2019-02-15

## 2019-02-15 ENCOUNTER — OFFICE VISIT (OUTPATIENT)
Dept: ONCOLOGY | Age: 74
End: 2019-02-15

## 2019-02-15 VITALS
OXYGEN SATURATION: 100 % | SYSTOLIC BLOOD PRESSURE: 131 MMHG | RESPIRATION RATE: 16 BRPM | HEIGHT: 65 IN | HEART RATE: 86 BPM | DIASTOLIC BLOOD PRESSURE: 75 MMHG | WEIGHT: 189 LBS | TEMPERATURE: 98.8 F | BODY MASS INDEX: 31.49 KG/M2

## 2019-02-15 DIAGNOSIS — D75.839 THROMBOCYTOSIS: ICD-10-CM

## 2019-02-15 DIAGNOSIS — D50.8 IRON DEFICIENCY ANEMIA SECONDARY TO INADEQUATE DIETARY IRON INTAKE: ICD-10-CM

## 2019-02-15 DIAGNOSIS — D50.8 IRON DEFICIENCY ANEMIA SECONDARY TO INADEQUATE DIETARY IRON INTAKE: Primary | ICD-10-CM

## 2019-02-15 LAB
ALBUMIN SERPL-MCNC: 3.9 G/DL (ref 3.4–5)
ALBUMIN/GLOB SERPL: 1.3 {RATIO} (ref 0.8–1.7)
ALP SERPL-CCNC: 101 U/L (ref 45–117)
ALT SERPL-CCNC: 29 U/L (ref 13–56)
ANION GAP SERPL CALC-SCNC: 8 MMOL/L (ref 3–18)
AST SERPL-CCNC: 22 U/L (ref 15–37)
BASO+EOS+MONOS # BLD AUTO: 0.6 K/UL (ref 0–2.3)
BASO+EOS+MONOS NFR BLD AUTO: 12 % (ref 0.1–17)
BILIRUB SERPL-MCNC: 0.3 MG/DL (ref 0.2–1)
BUN SERPL-MCNC: 23 MG/DL (ref 7–18)
BUN/CREAT SERPL: 19 (ref 12–20)
CALCIUM SERPL-MCNC: 8.9 MG/DL (ref 8.5–10.1)
CHLORIDE SERPL-SCNC: 108 MMOL/L (ref 100–108)
CO2 SERPL-SCNC: 28 MMOL/L (ref 21–32)
CREAT SERPL-MCNC: 1.24 MG/DL (ref 0.6–1.3)
DIFFERENTIAL METHOD BLD: ABNORMAL
ERYTHROCYTE [DISTWIDTH] IN BLOOD BY AUTOMATED COUNT: 13.8 % (ref 11.5–14.5)
FERRITIN SERPL-MCNC: 130 NG/ML (ref 8–388)
GLOBULIN SER CALC-MCNC: 3 G/DL (ref 2–4)
GLUCOSE SERPL-MCNC: 93 MG/DL (ref 74–99)
HCT VFR BLD AUTO: 38.1 % (ref 36–48)
HGB BLD-MCNC: 11.7 G/DL (ref 12–16)
IRON SATN MFR SERPL: 30 %
IRON SERPL-MCNC: 91 UG/DL (ref 50–175)
LYMPHOCYTES # BLD: 2.2 K/UL (ref 1.1–5.9)
LYMPHOCYTES NFR BLD: 42 % (ref 14–44)
MCH RBC QN AUTO: 25.8 PG (ref 25–35)
MCHC RBC AUTO-ENTMCNC: 30.7 G/DL (ref 31–37)
MCV RBC AUTO: 84.1 FL (ref 78–102)
NEUTS SEG # BLD: 2.4 K/UL (ref 1.8–9.5)
NEUTS SEG NFR BLD: 47 % (ref 40–70)
PLATELET # BLD AUTO: 405 K/UL (ref 140–440)
POTASSIUM SERPL-SCNC: 3.9 MMOL/L (ref 3.5–5.5)
PROT SERPL-MCNC: 6.9 G/DL (ref 6.4–8.2)
RBC # BLD AUTO: 4.53 M/UL (ref 4.1–5.1)
SODIUM SERPL-SCNC: 144 MMOL/L (ref 136–145)
TIBC SERPL-MCNC: 299 UG/DL (ref 250–450)
WBC # BLD AUTO: 5.2 K/UL (ref 4.5–13)

## 2019-02-15 PROCEDURE — 80053 COMPREHEN METABOLIC PANEL: CPT

## 2019-02-15 PROCEDURE — 83540 ASSAY OF IRON: CPT

## 2019-02-15 PROCEDURE — 36415 COLL VENOUS BLD VENIPUNCTURE: CPT

## 2019-02-15 PROCEDURE — 82728 ASSAY OF FERRITIN: CPT

## 2019-02-15 NOTE — PROGRESS NOTES
Hematology/Oncology  Progress Note    Name: Angelo Getting  Date: 2/15/2019  : 1945    PCP: Taisha Chilel MD     Ms. Kp Aquino is a 72 y.o.  female who was seen for management of her reactive thrombocytosis and iron deficiency anemia. Current therapy: Oral iron supplement once daily. Subjective:     Mrs. pK Aquino is a 77-year-old woman who has a history of reactive thrombocytosis and iron deficiency anemia. She is doing well. She is continue to take the oral iron supplement once daily. She has no complaints of constipation or abdominal cramps. She denies having any blood in her stool or urine. The patient overall reports that she is doing good however her stress level is high. Past medical history, family history, and social history: these were reviewed and remains unchanged.     Past Medical History:   Diagnosis Date    Anemia     Arthritis     Diabetes ([address]     Type 2    H/O seasonal allergies     High cholesterol     Hypertension      Past Surgical History:   Procedure Laterality Date    COLONOSCOPY N/A 2017    COLONOSCOPY performed by Feliciana Councilman, MD at [facility]    HX CYST INCISION AND DRAINAGE Right     \"approx 30 years ago\"    HX HYSTERECTOMY      \"approx 30 years ago\"     Social History     Socioeconomic History    Marital status:      Spouse name: Not on file    Number of children: Not on file    Years of education: Not on file    Highest education level: Not on file   Social Needs    Financial resource strain: Not on file    Food insecurity - worry: Not on file    Food insecurity - inability: Not on file   [employer] needs - medical: Not on file   [email or web address] needs - non-medical: Not on file   Occupational History    Not on file   Tobacco Use    Smoking status: Never Smoker   Substance and Sexual Activity    Alcohol use: No    Drug use: No    Sexual activity: Not on file   Other Topics Concern    Not on file   Social History Narrative    Not on file     Family History   Problem Relation Age of Onset    Diabetes Mother     Heart Disease Mother     Kidney Disease Mother     Cancer Father         h/o prostate cancer    Hypertension Father     Arthritis-osteo Father     Diabetes Sister     No Known Problems Brother     Diabetes Sister      Current Outpatient Medications   Medication Sig Dispense Refill    metFORMIN (GLUCOPHAGE) 500 mg tablet Take  by mouth two (2) times daily (with meals).  losartan (COZAAR) 50 mg tablet Take 50 mg by mouth daily.  atorvastatin (LIPITOR) 40 mg tablet Take 40 mg by mouth nightly.  NIFEdipine ER (ADALAT CC) 60 mg ER tablet Take 60 mg by mouth daily.  ferrous sulfate 325 mg (65 mg iron) tablet Take  by mouth Daily (before breakfast).  fexofenadine (ALLEGRA) 180 mg tablet Take  by mouth daily as needed for Allergies.  meloxicam (MOBIC) 15 mg tablet Take 15 mg by mouth as needed for Pain. Review of Systems  Constitutional: The patient has no acute distress or discomfort. HEENT: The patient denies recent head trauma, eye pain, blurred vision,  hearing deficit, oropharyngeal mucosal pain or lesions, and the patient denies throat pain or discomfort. Lymphatics: The patient denies palpable peripheral lymphadenopathy. Hematologic: The patient denies having bruising, bleeding, or progressive fatigue. Respiratory: Patient denies having shortness of breath, cough, sputum production, fever, or dyspnea on exertion. Cardiovascular: The patient denies having leg pain, leg swelling, heart palpitations, chest permit, chest pain, or lightheadedness. The patient denies having dyspnea on exertion. Gastrointestinal: The patient denies having nausea, emesis, or diarrhea. The patient denies having any hematemesis or blood in the stool. Genitourinary: Patient denies having urinary urgency, frequency, or dysuria.   The patient denies having blood in the urine.  Psychological: The patient denies having symptoms of nervousness, anxiety, depression, or thoughts of harming self. Skin: Patient denies having skin rashes, skin, ulcerations, or unexplained itching or pruritus. Musculoskeletal: The patient denies having pain in the joints or bones. Objective:     Visit Vitals  /75   Pulse 86   Temp 98.8 °F (37.1 °C) (Oral)   Resp 16   Ht 5' 5\" (1.651 m)   Wt 85.7 kg (189 lb)   SpO2 100%   BMI 31.45 kg/m²     Physical Exam:   Gen. Appearance: The patient is in no acute distress. Skin: There is no bruise or rash. HEENT: The exam is unremarkable. Neck: Supple without lymphadenopathy or thyromegaly. Lungs: Clear to auscultation and percussion; there are no wheezes or rhonchi. Heart: Regular rate and rhythm; there are no murmurs, gallops, or rubs. Abdomen: Bowel sounds are present and normal.  There is no guarding, tenderness, or hepatosplenomegaly. Extremities: There is no clubbing, cyanosis, or edema. Neurologic: There are no focal neurologic deficits. Lymphatics: There is no palpable peripheral lymphadenopathy. Musculoskeletal: The patient has full range of motion at all joints. There is no evidence of joint deformity or effusions. There is no focal joint tenderness. Psychological/psychiatric: There is no clinical evidence of anxiety, depression, or melancholy. Lab data:      Results for orders placed or performed during the hospital encounter of 02/15/19   CBC WITH 3 PART DIFF     Status: Abnormal   Result Value Ref Range Status    WBC 5.2 4.5 - 13.0 K/uL Final    RBC 4.53 4.10 - 5.10 M/uL Final    HGB 11.7 (L) 12.0 - 16.0 g/dL Final    HCT 38.1 36 - 48 % Final    MCV 84.1 78 - 102 FL Final    MCH 25.8 25.0 - 35.0 PG Final    MCHC 30.7 (L) 31 - 37 g/dL Final    RDW 13.8 11.5 - 14.5 % Final    PLATELET 173 326 - 192 K/uL Final    NEUTROPHILS 47 40 - 70 % Final    MIXED CELLS 12 0.1 - 17 % Final    LYMPHOCYTES 42 14 - 44 % Final    ABS.  NEUTROPHILS 2. 4 1.8 - 9.5 K/UL Final    ABS. MIXED CELLS 0.6 0.0 - 2.3 K/uL Final    ABS. LYMPHOCYTES 2.2 1.1 - 5.9 K/UL Final     Comment: Test performed at [address] [place] [address] or [facility] Location. Reviewed by Medical Director. DF AUTOMATED   Final           Assessment:     1. Iron deficiency anemia secondary to inadequate dietary iron intake    2. Thrombocytosis ([address]      Plan:   Reactive thrombocytosis: I have explained to the patient that her CBC from today shows that her platelet count is currently normal at 405,000. We will continue to monitor this at 3-4 month intervals. Iron deficiency anemia: The patient's hemoglobin is improving. The CBC from today shows that her hemoglobin is 11.7 g/dL with hematocrit of 38.1 %. The most recent iron studies from 11/13/2018 showed a ferritin level of 109 ng/mL, iron of 60 mcg/dL, and an iron saturation of 22%. The BUN and creatinine were normal . I have advised the patient to continue taking the oral iron supplement once daily. Follow-up in 3 months. Orders Placed This Encounter    COMPLETE CBC & AUTO DIFF WBC    InHouse CBC ([employer])     Standing Status:   Future     Number of Occurrences:   1     Standing Expiration Date:   4/80/2939    METABOLIC PANEL, COMPREHENSIVE     Standing Status:   Future     Standing Expiration Date:   2/16/2020    IRON PROFILE     Standing Status:   Future     Standing Expiration Date:   2/16/2020    FERRITIN     Standing Status:   Future     Standing Expiration Date:   2/16/2020       Bradford Nicole MD  2/15/2019      Please note: This document has been produced using voice recognition software. Unrecognized errors in transcription may be present.

## 2019-02-15 NOTE — PATIENT INSTRUCTIONS
Iron Deficiency Anemia: Care Instructions  Your Care Instructions    Anemia means that you do not have enough red blood cells. Red blood cells carry oxygen around your body. When you have anemia, it can make you pale, weak, and tired. Many things can cause anemia. The most common cause is loss of blood. This can happen if you have heavy menstrual periods. It can also happen if you have bleeding in your stomach or bowel. You can also get anemia if you don't have enough iron in your diet or if it's hard for your body to absorb iron. In some cases, pregnancy causes anemia. That's because a pregnant woman needs more iron. Your doctor may do more tests to find the cause of your anemia. If a disease or other health problem is causing it, your doctor will treat that problem. It's important to follow up with your doctor to make sure that your iron level returns to normal.  Follow-up care is a key part of your treatment and safety. Be sure to make and go to all appointments, and call your doctor if you are having problems. It's also a good idea to know your test results and keep a list of the medicines you take. How can you care for yourself at home? · If your doctor recommended iron pills, take them as directed. ? Try to take the pills on an empty stomach. You can do this about 1 hour before or 2 hours after meals. But you may need to take iron with food to avoid an upset stomach. ? Do not take antacids or drink milk or anything with caffeine within 2 hours of when you take your iron. They can keep your body from absorbing the iron well. ? Vitamin C helps your body absorb iron. You may want to take iron pills with a glass of orange juice or some other food high in vitamin C.  ? Iron pills may cause stomach problems. These include heartburn, nausea, diarrhea, constipation, and cramps. It can help to drink plenty of fluids and include fruits, vegetables, and fiber in your diet.   ? It's normal for iron pills to make your stool a greenish or grayish black. But internal bleeding can also cause dark stool. So it's important to tell your doctor about any color changes. ? Call your doctor if you think you are having a problem with your iron pills. Even after you start to feel better, it will take several months for your body to build up its supply of iron. ? If you miss a pill, don't take a double dose. ? Keep iron pills out of the reach of small children. Too much iron can be very dangerous. · Eat foods with a lot of iron. These include red meat, shellfish, poultry, and eggs. They also include beans, raisins, whole-grain bread, and leafy green vegetables. · Steam your vegetables. This is the best way to prepare them if you want to get as much iron as possible. · Be safe with medicines. Do not take nonsteroidal anti-inflammatory pain relievers unless your doctor tells you to. These include aspirin, naproxen (Aleve), and ibuprofen (Advil, Motrin). · Liquid iron can stain your teeth. But you can mix it with water or juice and drink it with a straw. Then it won't get on your teeth. When should you call for help? Call 911 anytime you think you may need emergency care. For example, call if:    · You passed out (lost consciousness).    Call your doctor now or seek immediate medical care if:    · You are short of breath.     · You are dizzy or light-headed, or you feel like you may faint.     · You have new or worse bleeding.    Watch closely for changes in your health, and be sure to contact your doctor if:    · You feel weaker or more tired than usual.     · You do not get better as expected. Where can you learn more? Go to http://zoraida-oscar.info/. Enter N170 in the search box to learn more about \"Iron Deficiency Anemia: Care Instructions. \"  Current as of: May 6, 2018  Content Version: 11.9  © 9631-9388 [employer].  Care instructions adapted under license by [employer] (which disclaims liability or warranty for this information). If you have questions about a medical condition or this instruction, always ask your healthcare professional. [address] any warranty or liability for your use of this information.

## 2019-02-25 ENCOUNTER — HOSPITAL ENCOUNTER (OUTPATIENT)
Dept: MAMMOGRAPHY | Age: 74
Discharge: HOME | End: 2019-02-25
Attending: FAMILY MEDICINE
Payer: MEDICARE

## 2019-02-25 ENCOUNTER — HOSPITAL ENCOUNTER (OUTPATIENT)
Dept: BONE DENSITY | Age: 74
Discharge: HOME | End: 2019-02-25
Attending: FAMILY MEDICINE
Payer: MEDICARE

## 2019-02-25 DIAGNOSIS — Z12.31 VISIT FOR SCREENING MAMMOGRAM: ICD-10-CM

## 2019-02-25 DIAGNOSIS — M81.0 OSTEOPOROSIS: ICD-10-CM

## 2019-02-25 PROCEDURE — 77067 SCR MAMMO BI INCL CAD: CPT

## 2019-02-25 PROCEDURE — 77080 DXA BONE DENSITY AXIAL: CPT

## 2019-05-17 ENCOUNTER — OFFICE VISIT (OUTPATIENT)
Dept: ONCOLOGY | Age: 74
End: 2019-05-17

## 2019-05-17 ENCOUNTER — HOSPITAL ENCOUNTER (OUTPATIENT)
Dept: LAB | Age: 74
Discharge: HOME | End: 2019-05-17
Payer: MEDICARE

## 2019-05-17 ENCOUNTER — HOSPITAL ENCOUNTER (OUTPATIENT)
Dept: ONCOLOGY | Age: 74
Discharge: HOME | End: 2019-05-17

## 2019-05-17 VITALS
TEMPERATURE: 98.5 F | RESPIRATION RATE: 16 BRPM | WEIGHT: 191 LBS | OXYGEN SATURATION: 100 % | BODY MASS INDEX: 31.82 KG/M2 | HEIGHT: 65 IN

## 2019-05-17 DIAGNOSIS — D75.839 THROMBOCYTOSIS: ICD-10-CM

## 2019-05-17 DIAGNOSIS — D50.8 IRON DEFICIENCY ANEMIA SECONDARY TO INADEQUATE DIETARY IRON INTAKE: ICD-10-CM

## 2019-05-17 DIAGNOSIS — D50.8 IRON DEFICIENCY ANEMIA SECONDARY TO INADEQUATE DIETARY IRON INTAKE: Primary | ICD-10-CM

## 2019-05-17 LAB
ALBUMIN SERPL-MCNC: 4.1 G/DL (ref 3.4–5)
ALBUMIN/GLOB SERPL: 1.4 {RATIO} (ref 0.8–1.7)
ALP SERPL-CCNC: 97 U/L (ref 45–117)
ALT SERPL-CCNC: 31 U/L (ref 13–56)
ANION GAP SERPL CALC-SCNC: 7 MMOL/L (ref 3–18)
AST SERPL-CCNC: 25 U/L (ref 15–37)
BASO+EOS+MONOS # BLD AUTO: 0.4 K/UL (ref 0–2.3)
BASO+EOS+MONOS NFR BLD AUTO: 8 % (ref 0.1–17)
BILIRUB SERPL-MCNC: 0.4 MG/DL (ref 0.2–1)
BUN SERPL-MCNC: 17 MG/DL (ref 7–18)
BUN/CREAT SERPL: 14 (ref 12–20)
CALCIUM SERPL-MCNC: 9.2 MG/DL (ref 8.5–10.1)
CHLORIDE SERPL-SCNC: 108 MMOL/L (ref 100–108)
CO2 SERPL-SCNC: 26 MMOL/L (ref 21–32)
CREAT SERPL-MCNC: 1.21 MG/DL (ref 0.6–1.3)
DIFFERENTIAL METHOD BLD: ABNORMAL
ERYTHROCYTE [DISTWIDTH] IN BLOOD BY AUTOMATED COUNT: 13.7 % (ref 11.5–14.5)
FERRITIN SERPL-MCNC: 167 NG/ML (ref 8–388)
GLOBULIN SER CALC-MCNC: 2.9 G/DL (ref 2–4)
GLUCOSE SERPL-MCNC: 122 MG/DL (ref 74–99)
HCT VFR BLD AUTO: 37.7 % (ref 36–48)
HGB BLD-MCNC: 11.7 G/DL (ref 12–16)
IRON SATN MFR SERPL: 24 %
IRON SERPL-MCNC: 73 UG/DL (ref 50–175)
LYMPHOCYTES # BLD: 2.4 K/UL (ref 1.1–5.9)
LYMPHOCYTES NFR BLD: 44 % (ref 14–44)
MCH RBC QN AUTO: 26.2 PG (ref 25–35)
MCHC RBC AUTO-ENTMCNC: 31 G/DL (ref 31–37)
MCV RBC AUTO: 84.3 FL (ref 78–102)
NEUTS SEG # BLD: 2.7 K/UL (ref 1.8–9.5)
NEUTS SEG NFR BLD: 49 % (ref 40–70)
PLATELET # BLD AUTO: 368 K/UL (ref 140–440)
POTASSIUM SERPL-SCNC: 3.9 MMOL/L (ref 3.5–5.5)
PROT SERPL-MCNC: 7 G/DL (ref 6.4–8.2)
RBC # BLD AUTO: 4.47 M/UL (ref 4.1–5.1)
SODIUM SERPL-SCNC: 141 MMOL/L (ref 136–145)
TIBC SERPL-MCNC: 309 UG/DL (ref 250–450)
WBC # BLD AUTO: 5.5 K/UL (ref 4.5–13)

## 2019-05-17 PROCEDURE — 80053 COMPREHEN METABOLIC PANEL: CPT

## 2019-05-17 PROCEDURE — 82728 ASSAY OF FERRITIN: CPT

## 2019-05-17 PROCEDURE — 36415 COLL VENOUS BLD VENIPUNCTURE: CPT

## 2019-05-17 PROCEDURE — 83540 ASSAY OF IRON: CPT

## 2019-05-17 NOTE — PATIENT INSTRUCTIONS
Iron Deficiency Anemia: Care Instructions Your Care Instructions Anemia means that you do not have enough red blood cells. Red blood cells carry oxygen around your body. When you have anemia, it can make you pale, weak, and tired. Many things can cause anemia. The most common cause is loss of blood. This can happen if you have heavy menstrual periods. It can also happen if you have bleeding in your stomach or bowel. You can also get anemia if you don't have enough iron in your diet or if it's hard for your body to absorb iron. In some cases, pregnancy causes anemia. That's because a pregnant woman needs more iron. Your doctor may do more tests to find the cause of your anemia. If a disease or other health problem is causing it, your doctor will treat that problem. It's important to follow up with your doctor to make sure that your iron level returns to normal. 
Follow-up care is a key part of your treatment and safety. Be sure to make and go to all appointments, and call your doctor if you are having problems. It's also a good idea to know your test results and keep a list of the medicines you take. How can you care for yourself at home? · If your doctor recommended iron pills, take them as directed. ? Try to take the pills on an empty stomach. You can do this about 1 hour before or 2 hours after meals. But you may need to take iron with food to avoid an upset stomach. ? Do not take antacids or drink milk or anything with caffeine within 2 hours of when you take your iron. They can keep your body from absorbing the iron well. ? Vitamin C helps your body absorb iron. You may want to take iron pills with a glass of orange juice or some other food high in vitamin C. 
? Iron pills may cause stomach problems. These include heartburn, nausea, diarrhea, constipation, and cramps. It can help to drink plenty of fluids and include fruits, vegetables, and fiber in your diet. ? It's normal for iron pills to make your stool a greenish or grayish black. But internal bleeding can also cause dark stool. So it's important to tell your doctor about any color changes. ? Call your doctor if you think you are having a problem with your iron pills. Even after you start to feel better, it will take several months for your body to build up its supply of iron. ? If you miss a pill, don't take a double dose. ? Keep iron pills out of the reach of small children. Too much iron can be very dangerous. · Eat foods with a lot of iron. These include red meat, shellfish, poultry, and eggs. They also include beans, raisins, whole-grain bread, and leafy green vegetables. · Steam your vegetables. This is the best way to prepare them if you want to get as much iron as possible. · Be safe with medicines. Do not take nonsteroidal anti-inflammatory pain relievers unless your doctor tells you to. These include aspirin, naproxen (Aleve), and ibuprofen (Advil, Motrin). · Liquid iron can stain your teeth. But you can mix it with water or juice and drink it with a straw. Then it won't get on your teeth. When should you call for help? Call 911 anytime you think you may need emergency care. For example, call if: 
  · You passed out (lost consciousness).  
 Call your doctor now or seek immediate medical care if: 
  · You are short of breath.  
  · You are dizzy or light-headed, or you feel like you may faint.  
  · You have new or worse bleeding.  
 Watch closely for changes in your health, and be sure to contact your doctor if: 
  · You feel weaker or more tired than usual.  
  · You do not get better as expected. Where can you learn more? Go to http://zoraida-oscar.info/. Enter B655 in the search box to learn more about \"Iron Deficiency Anemia: Care Instructions. \" Current as of: May 6, 2018 Content Version: 11.9 © 5990-2473 [employer].  Care instructions adapted under license by [address] (which disclaims liability or warranty for this information). If you have questions about a medical condition or this instruction, always ask your healthcare professional. [address] any warranty or liability for your use of this information.

## 2019-05-17 NOTE — PROGRESS NOTES
Hematology/Oncology  Progress Note    Name: Queen Franklin  Date: 2019  : 1945    PCP: Melodie Posadas MD     Ms. Devi Zamora is a 72 y.o.  female who was seen for management of her reactive thrombocytosis and iron deficiency anemia. Current therapy: Oral iron supplement once daily. Subjective:     Mrs. Devi Zamora is a 62-year-old woman who has a history of reactive thrombocytosis and iron deficiency anemia. She is doing well. She continues to take the oral iron supplement once daily. She has no complaints of constipation or abdominal cramps. She denies having any blood in her stool or urine. The patient overall reports that she is doing good however her stress level is high. Past medical history, family history, and social history: these were reviewed and remains unchanged.     Past Medical History:   Diagnosis Date    Anemia     Arthritis     Diabetes ([address]     Type 2    H/O seasonal allergies     High cholesterol     Hypertension      Past Surgical History:   Procedure Laterality Date    COLONOSCOPY N/A 2017    COLONOSCOPY performed by Marian Duran MD at [facility] ENDOSCOPY    HX CYST INCISION AND DRAINAGE Right     \"approx 30 years ago\"    HX HYSTERECTOMY      \"approx 30 years ago\"     Social History     Socioeconomic History    Marital status:      Spouse name: Not on file    Number of children: Not on file    Years of education: Not on file    Highest education level: Not on file   Occupational History    Not on file   Social Needs    Financial resource strain: Not on file    Food insecurity:     Worry: Not on file     Inability: Not on file    Transportation needs:     Medical: Not on file     Non-medical: Not on file   Tobacco Use    Smoking status: Never Smoker   Substance and Sexual Activity    Alcohol use: No    Drug use: No    Sexual activity: Not on file   Lifestyle    Physical activity:     Days per week: Not on file     Minutes per session: Not on file    Stress: Not on file   Relationships    Social connections:     Talks on phone: Not on file     Gets together: Not on file     Attends Quaker service: Not on file     Active member of club or organization: Not on file     Attends meetings of clubs or organizations: Not on file     Relationship status: Not on file    Intimate partner violence:     Fear of current or ex partner: Not on file     Emotionally abused: Not on file     Physically abused: Not on file     Forced sexual activity: Not on file   Other Topics Concern    Not on file   Social History Narrative    Not on file     Family History   Problem Relation Age of Onset    Diabetes Mother     Heart Disease Mother     Kidney Disease Mother     Cancer Father         h/o prostate cancer    Hypertension Father     Arthritis-osteo Father     Diabetes Sister     No Known Problems Brother     Diabetes Sister      Current Outpatient Medications   Medication Sig Dispense Refill    iron polysaccharides (FERREX 150) 150 mg iron capsule Take 1 Cap by mouth two (2) times a day. 30 Cap 6    metFORMIN (GLUCOPHAGE) 500 mg tablet Take  by mouth two (2) times daily (with meals).  losartan (COZAAR) 50 mg tablet Take 50 mg by mouth daily.  atorvastatin (LIPITOR) 40 mg tablet Take 40 mg by mouth nightly.  NIFEdipine ER (ADALAT CC) 60 mg ER tablet Take 60 mg by mouth daily.  ferrous sulfate 325 mg (65 mg iron) tablet Take  by mouth Daily (before breakfast).  fexofenadine (ALLEGRA) 180 mg tablet Take  by mouth daily as needed for Allergies.  meloxicam (MOBIC) 15 mg tablet Take 15 mg by mouth as needed for Pain. Review of Systems  Constitutional: The patient has no acute distress or discomfort. HEENT: The patient denies recent head trauma, eye pain, blurred vision,  hearing deficit, oropharyngeal mucosal pain or lesions, and the patient denies throat pain or discomfort. Lymphatics:  The patient denies palpable peripheral lymphadenopathy. Hematologic: The patient denies having bruising, bleeding, or progressive fatigue. Respiratory: Patient denies having shortness of breath, cough, sputum production, fever, or dyspnea on exertion. Cardiovascular: The patient denies having leg pain, leg swelling, heart palpitations, chest permit, chest pain, or lightheadedness. The patient denies having dyspnea on exertion. Gastrointestinal: The patient denies having nausea, emesis, or diarrhea. The patient denies having any hematemesis or blood in the stool. Genitourinary: Patient denies having urinary urgency, frequency, or dysuria. The patient denies having blood in the urine. Psychological: The patient denies having symptoms of nervousness, anxiety, depression, or thoughts of harming self. Skin: Patient denies having skin rashes, skin, ulcerations, or unexplained itching or pruritus. Musculoskeletal: The patient denies having pain in the joints or bones. Objective:     Visit Vitals  Temp 98.5 °F (36.9 °C) (Oral)   Resp 16   Ht 5' 5\" (1.651 m)   Wt 86.6 kg (191 lb)   SpO2 100%   BMI 31.78 kg/m²     Physical Exam:   Gen. Appearance: The patient is in no acute distress. Skin: There is no bruise or rash. HEENT: The exam is unremarkable. Neck: Supple without lymphadenopathy or thyromegaly. Lungs: Clear to auscultation and percussion; there are no wheezes or rhonchi. Heart: Regular rate and rhythm; there are no murmurs, gallops, or rubs. Abdomen: Bowel sounds are present and normal.  There is no guarding, tenderness, or hepatosplenomegaly. Extremities: There is no clubbing, cyanosis, or edema. Neurologic: There are no focal neurologic deficits. Lymphatics: There is no palpable peripheral lymphadenopathy. Musculoskeletal: The patient has full range of motion at all joints. There is no evidence of joint deformity or effusions. There is no focal joint tenderness.   Psychological/psychiatric: There is no clinical evidence of anxiety, depression, or melancholy. Lab data:      Results for orders placed or performed during the hospital encounter of 05/17/19   CBC WITH 3 PART DIFF     Status: Abnormal   Result Value Ref Range Status    WBC 5.5 4.5 - 13.0 K/uL Final    RBC 4.47 4.10 - 5.10 M/uL Final    HGB 11.7 (L) 12.0 - 16.0 g/dL Final    HCT 37.7 36 - 48 % Final    MCV 84.3 78 - 102 FL Final    MCH 26.2 25.0 - 35.0 PG Final    MCHC 31.0 31 - 37 g/dL Final    RDW 13.7 11.5 - 14.5 % Final    PLATELET 846 608 - 571 K/uL Final    NEUTROPHILS 49 40 - 70 % Final    MIXED CELLS 8 0.1 - 17 % Final    LYMPHOCYTES 44 14 - 44 % Final    ABS. NEUTROPHILS 2.7 1.8 - 9.5 K/UL Final    ABS. MIXED CELLS 0.4 0.0 - 2.3 K/uL Final    ABS. LYMPHOCYTES 2.4 1.1 - 5.9 K/UL Final     Comment: Test performed at [address] [place] [address] or Outpatient Infusion Center Location. Reviewed by Medical Director. DF AUTOMATED   Final           Assessment:     1. Iron deficiency anemia secondary to inadequate dietary iron intake    2. Thrombocytosis ([address]      Plan:   Reactive thrombocytosis: I have explained to the patient that her CBC from today shows that her platelet count is currently normal at 368,000. We will continue to monitor this at 3-4 month intervals. Iron deficiency anemia: The patient's hemoglobin is improving. The CBC from today shows that her hemoglobin is 11.7 g/dL with hematocrit of 37.7 %. The most recent iron studies from 2/15/2019 showed a ferritin level of 130 ng/mL, iron of 91 mcg/dL, and an iron saturation of 30%. The BUN and creatinine ratio was normal . I have advised the patient to continue taking the oral iron supplement once daily. A prescription for Ferrex150 was sent to her pharmacy for pickup. Follow-up in 3 months.   Orders Placed This Encounter    COMPLETE CBC & AUTO DIFF WBC    InHouse CBC ("[employer]     Standing Status:   Future     Number of Occurrences:   1     Standing Expiration Date:   8/51/0129    METABOLIC PANEL, COMPREHENSIVE     Standing Status:   Future     Number of Occurrences:   1     Standing Expiration Date:   5/17/2020    IRON PROFILE     Standing Status:   Future     Number of Occurrences:   1     Standing Expiration Date:   5/17/2020    FERRITIN     Standing Status:   Future     Number of Occurrences:   1     Standing Expiration Date:   5/17/2020    iron polysaccharides (FERREX 150) 150 mg iron capsule     Sig: Take 1 Cap by mouth two (2) times a day. Dispense:  30 Cap     Refill:  6       Omar Cuadra MD  5/17/2019      Please note: This document has been produced using voice recognition software. Unrecognized errors in transcription may be present.

## 2019-05-19 RX ORDER — IRON POLYSACCHARIDE COMPLEX 150 MG
150 CAPSULE ORAL 2 TIMES DAILY
Qty: 30 CAP | Refills: 6 | Status: SHIPPED | OUTPATIENT
Start: 2019-05-19

## 2019-08-23 ENCOUNTER — OFFICE VISIT (OUTPATIENT)
Dept: ONCOLOGY | Age: 74
End: 2019-08-23

## 2019-08-23 ENCOUNTER — HOSPITAL ENCOUNTER (OUTPATIENT)
Dept: LAB | Age: 74
Discharge: HOME | End: 2019-08-23
Payer: MEDICARE

## 2019-08-23 VITALS
RESPIRATION RATE: 16 BRPM | TEMPERATURE: 98.2 F | HEIGHT: 65 IN | OXYGEN SATURATION: 98 % | BODY MASS INDEX: 32.15 KG/M2 | WEIGHT: 193 LBS | SYSTOLIC BLOOD PRESSURE: 136 MMHG | HEART RATE: 70 BPM | DIASTOLIC BLOOD PRESSURE: 67 MMHG

## 2019-08-23 DIAGNOSIS — D50.8 IRON DEFICIENCY ANEMIA SECONDARY TO INADEQUATE DIETARY IRON INTAKE: ICD-10-CM

## 2019-08-23 DIAGNOSIS — D75.839 THROMBOCYTOSIS: ICD-10-CM

## 2019-08-23 DIAGNOSIS — D75.839 THROMBOCYTOSIS: Primary | ICD-10-CM

## 2019-08-23 LAB
ALBUMIN SERPL-MCNC: 3.8 G/DL (ref 3.4–5)
ALBUMIN/GLOB SERPL: 1.3 {RATIO} (ref 0.8–1.7)
ALP SERPL-CCNC: 78 U/L (ref 45–117)
ALT SERPL-CCNC: 27 U/L (ref 13–56)
ANION GAP SERPL CALC-SCNC: 6 MMOL/L (ref 3–18)
AST SERPL-CCNC: 21 U/L (ref 10–38)
BASOPHILS # BLD: 0 K/UL (ref 0–0.1)
BASOPHILS NFR BLD: 1 % (ref 0–2)
BILIRUB SERPL-MCNC: 0.6 MG/DL (ref 0.2–1)
BUN SERPL-MCNC: 19 MG/DL (ref 7–18)
BUN/CREAT SERPL: 15 (ref 12–20)
CALCIUM SERPL-MCNC: 9.4 MG/DL (ref 8.5–10.1)
CHLORIDE SERPL-SCNC: 106 MMOL/L (ref 100–111)
CO2 SERPL-SCNC: 29 MMOL/L (ref 21–32)
CREAT SERPL-MCNC: 1.23 MG/DL (ref 0.6–1.3)
DIFFERENTIAL METHOD BLD: ABNORMAL
EOSINOPHIL # BLD: 0.4 K/UL (ref 0–0.4)
EOSINOPHIL NFR BLD: 8 % (ref 0–5)
ERYTHROCYTE [DISTWIDTH] IN BLOOD BY AUTOMATED COUNT: 14.5 % (ref 11.6–14.5)
GLOBULIN SER CALC-MCNC: 3 G/DL (ref 2–4)
GLUCOSE SERPL-MCNC: 131 MG/DL (ref 74–99)
HCT VFR BLD AUTO: 37.3 % (ref 35–45)
HGB BLD-MCNC: 11.6 G/DL (ref 12–16)
LYMPHOCYTES # BLD: 1.9 K/UL (ref 0.9–3.6)
LYMPHOCYTES NFR BLD: 37 % (ref 21–52)
MCH RBC QN AUTO: 25.8 PG (ref 24–34)
MCHC RBC AUTO-ENTMCNC: 31.1 G/DL (ref 31–37)
MCV RBC AUTO: 83.1 FL (ref 74–97)
MONOCYTES # BLD: 0.4 K/UL (ref 0.05–1.2)
MONOCYTES NFR BLD: 8 % (ref 3–10)
NEUTS SEG # BLD: 2.3 K/UL (ref 1.8–8)
NEUTS SEG NFR BLD: 46 % (ref 40–73)
PLATELET # BLD AUTO: 379 K/UL (ref 135–420)
PMV BLD AUTO: 9.3 FL (ref 9.2–11.8)
POTASSIUM SERPL-SCNC: 3.8 MMOL/L (ref 3.5–5.5)
PROT SERPL-MCNC: 6.8 G/DL (ref 6.4–8.2)
RBC # BLD AUTO: 4.49 M/UL (ref 4.2–5.3)
SODIUM SERPL-SCNC: 141 MMOL/L (ref 136–145)
WBC # BLD AUTO: 5 K/UL (ref 4.6–13.2)

## 2019-08-23 PROCEDURE — 36415 COLL VENOUS BLD VENIPUNCTURE: CPT

## 2019-08-23 PROCEDURE — 85025 COMPLETE CBC W/AUTO DIFF WBC: CPT

## 2019-08-23 PROCEDURE — 83540 ASSAY OF IRON: CPT

## 2019-08-23 PROCEDURE — 80053 COMPREHEN METABOLIC PANEL: CPT

## 2019-08-23 PROCEDURE — 82728 ASSAY OF FERRITIN: CPT

## 2019-08-23 NOTE — PROGRESS NOTES
Hematology/Oncology  Progress Note    Name: Prateek Poon  Date: 2019  : 1945    PCP: Katie Valderrama MD     Ms. Marbella Mckeon is a 69 y.o.  female who was seen for management of her reactive thrombocytosis and iron deficiency anemia. Current therapy: Oral iron supplement once daily. Subjective:     Mrs. Marbella Mckeon is a 68-year-old woman who has a history of reactive thrombocytosis and iron deficiency anemia. She was diagnosed in 2017. She states she is doing well. She continues to take the oral iron supplement once daily. She denies weakness, fatigue, and dizziness. She denies chest pain or any discomfort. She has no complaints of constipation or abdominal cramps. She denies having any blood in her stool or urine. The patient overall reports that she is doing good. She has no other complaints or concerns to report    Past medical history, family history, and social history: these were reviewed and remains unchanged.     Past Medical History:   Diagnosis Date    Anemia     Arthritis     Diabetes ([address]     Type 2    H/O seasonal allergies     High cholesterol     Hypertension      Past Surgical History:   Procedure Laterality Date    COLONOSCOPY N/A 2017    [facility]COPY    HX CYST INCISION AND DRAINAGE Right     \"approx 30 years ago\"    HX HYSTERECTOMY      \"approx 30 years ago\"     Social History     Socioeconomic History    Marital status:      Spouse name: Not on file    Number of children: Not on file    Years of education: Not on file    Highest education level: Not on file   Occupational History    Not on file   Social Needs    Financial resource strain: Not on file    Food insecurity:     Worry: Not on file     Inability: Not on file    Transportation needs:     Medical: Not on file     Non-medical: Not on file   Tobacco Use    Smoking status: Never Smoker   Substance and Sexual Activity    Alcohol use: No    Drug use: No    Sexual activity: Not on file   Lifestyle    Physical activity:     Days per week: Not on file     Minutes per session: Not on file    Stress: Not on file   Relationships    Social connections:     Talks on phone: Not on file     Gets together: Not on file     Attends Druze service: Not on file     Active member of club or organization: Not on file     Attends meetings of clubs or organizations: Not on file     Relationship status: Not on file    Intimate partner violence:     Fear of current or ex partner: Not on file     Emotionally abused: Not on file     Physically abused: Not on file     Forced sexual activity: Not on file   Other Topics Concern    Not on file   Social History Narrative    Not on file     Family History   Problem Relation Age of Onset    Diabetes Mother     Heart Disease Mother     Kidney Disease Mother     Cancer Father         h/o prostate cancer    Hypertension Father     Arthritis-osteo Father     Diabetes Sister     No Known Problems Brother     Diabetes Sister      Current Outpatient Medications   Medication Sig Dispense Refill    iron polysaccharides (FERREX 150) 150 mg iron capsule Take 1 Cap by mouth two (2) times a day. 30 Cap 6    metFORMIN (GLUCOPHAGE) 500 mg tablet Take  by mouth two (2) times daily (with meals).  losartan (COZAAR) 50 mg tablet Take 50 mg by mouth daily.  atorvastatin (LIPITOR) 40 mg tablet Take 40 mg by mouth nightly.  NIFEdipine ER (ADALAT CC) 60 mg ER tablet Take 60 mg by mouth daily.  ferrous sulfate 325 mg (65 mg iron) tablet Take  by mouth Daily (before breakfast).  fexofenadine (ALLEGRA) 180 mg tablet Take  by mouth daily as needed for Allergies.  meloxicam (MOBIC) 15 mg tablet Take 15 mg by mouth as needed for Pain. Review of Systems  Constitutional: The patient has no acute distress or discomfort.   HEENT: The patient denies recent head trauma, eye pain, blurred vision, hearing deficit, oropharyngeal mucosal pain or lesions, and the patient denies throat pain or discomfort. Lymphatics: The patient denies palpable peripheral lymphadenopathy. Hematologic: The patient denies having bruising, bleeding, or progressive fatigue. Respiratory: Patient denies having shortness of breath, cough, sputum production, fever, or dyspnea on exertion. Cardiovascular: The patient denies having leg pain, leg swelling, heart palpitations, chest permit, chest pain, or lightheadedness. The patient denies having dyspnea on exertion. Gastrointestinal: The patient denies having nausea, emesis, or diarrhea. The patient denies having any hematemesis or blood in the stool. Genitourinary: Patient denies having urinary urgency, frequency, or dysuria. The patient denies having blood in the urine. Psychological: The patient denies having symptoms of nervousness, anxiety, depression, or thoughts of harming self. Skin: Patient denies having skin rashes, skin, ulcerations, or unexplained itching or pruritus. Musculoskeletal: The patient denies having pain in the joints or bones. Objective:     Visit Vitals  /67 (BP 1 Location: Left arm, BP Patient Position: Sitting)   Pulse 70   Temp 98.2 °F (36.8 °C) (Oral)   Resp 16   Ht 5' 5\" (1.651 m)   Wt 87.5 kg (193 lb)   SpO2 98%   BMI 32.12 kg/m²     Physical Exam:   Gen. Appearance: The patient is in no acute distress. Skin: There is no bruise or rash. HEENT: The exam is unremarkable. Neck: Supple without lymphadenopathy or thyromegaly. Lungs: Clear to auscultation and percussion; there are no wheezes or rhonchi. Heart: Regular rate and rhythm; there are no murmurs, gallops, or rubs. Abdomen: Bowel sounds are present and normal.  There is no guarding, tenderness, or hepatosplenomegaly. Extremities: There is no clubbing, cyanosis, or edema. Neurologic: There are no focal neurologic deficits. Lymphatics:  There is no palpable peripheral lymphadenopathy. Musculoskeletal: The patient has full range of motion at all joints. There is no evidence of joint deformity or effusions. There is no focal joint tenderness. Psychological/psychiatric: There is no clinical evidence of anxiety, depression, or melancholy. Lab data:      Results for orders placed or performed during the hospital encounter of 05/17/19   CBC WITH 3 PART DIFF     Status: Abnormal   Result Value Ref Range Status    WBC 5.5 4.5 - 13.0 K/uL Final    RBC 4.47 4.10 - 5.10 M/uL Final    HGB 11.7 (L) 12.0 - 16.0 g/dL Final    HCT 37.7 36 - 48 % Final    MCV 84.3 78 - 102 FL Final    MCH 26.2 25.0 - 35.0 PG Final    MCHC 31.0 31 - 37 g/dL Final    RDW 13.7 11.5 - 14.5 % Final    PLATELET 491 196 - 198 K/uL Final    NEUTROPHILS 49 40 - 70 % Final    MIXED CELLS 8 0.1 - 17 % Final    LYMPHOCYTES 44 14 - 44 % Final    ABS. NEUTROPHILS 2.7 1.8 - 9.5 K/UL Final    ABS. MIXED CELLS 0.4 0.0 - 2.3 K/uL Final    ABS. LYMPHOCYTES 2.4 1.1 - 5.9 K/UL Final     Comment: Test performed at [address] [place] [address] or Outpatient Infusion Center Location. Reviewed by Medical Director. DF AUTOMATED   Final           Assessment:     1. Thrombocytosis ([address]    2. Iron deficiency anemia secondary to inadequate dietary iron intake      Plan:   Reactive thrombocytosis: I have explained to the patient that her most recent CBC showed that her platelet count is currently normal at 368,000. We will continue to monitor this at 3-4 month intervals. Iron deficiency anemia: The patient's hemoglobin is improving. The most recent CBC showed that her hemoglobin is 11.7 g/dL with hematocrit of 37.7%. The most recent iron studies showed her ferritin level was 167g/mL, iron of 73 mcg/dL, and an iron saturation of 24%. I have advised the patient to continue taking the oral iron supplement once daily. Follow-up in 3 months or sooner if indicated.     Orders Placed This Encounter    IRON PROFILE     Standing Status:   Future     Standing Expiration Date:   0/16/3720    METABOLIC PANEL, COMPREHENSIVE     Standing Status:   Future     Standing Expiration Date:   8/23/2020    FERRITIN     Standing Status:   Future     Standing Expiration Date:   8/23/2020       Ran Toth NP  08/23/2019    I have assessed the patient independently and  agree with the full assessment as outlined.   Henrine Gaucher, MD, [facility]

## 2019-08-23 NOTE — PATIENT INSTRUCTIONS
Complete Blood Count (CBC): About This Test  What is it? A complete blood count (CBC) is a blood test that gives important information about your blood cells, especially red blood cells, white blood cells, and platelets. Why is this test done? A CBC may be done as part of a regular physical exam. There are many other reasons that a doctor may want this blood test, including to:  · Find the cause of symptoms such as fatigue, weakness, fever, bruising, or weight loss. · Find anemia or an infection. · See how much blood has been lost if there is bleeding. · Diagnose diseases of the blood, such as leukemia or polycythemia. How can you prepare for the test?  You do not need to do anything before having this test.  What happens during the test?  The health professional taking a sample of your blood will:  · Wrap an elastic band around your upper arm. This makes the veins below the band larger so it is easier to put a needle into the vein. · Clean the needle site with alcohol. · Put the needle into the vein. · Attach a tube to the needle to fill it with blood. · Remove the band from your arm when enough blood is collected. · Put a gauze pad or cotton ball over the needle site as the needle is removed. · Put pressure on the site and then put on a bandage. If this blood test is done on a baby, a heel stick may be done instead of a blood draw from a vein. What happens after the test?  · You will probably be able to go home right away. · You can go back to your usual activities right away. Follow-up care is a key part of your treatment and safety. Be sure to make and go to all appointments, and call your doctor if you are having problems. It's also a good idea to keep a list of the medicines you take. Ask your doctor when you can expect to have your test results. Where can you learn more? Go to http://zoraida-oscar.info/.   Enter P751 in the search box to learn more about \"Complete Blood Count (CBC): About This Test.\"  Current as of: March 28, 2019  Content Version: 12.1  © 6722-3400 [employer]. Care instructions adapted under license by [employer] (which disclaims liability or warranty for this information). If you have questions about a medical condition or this instruction, always ask your healthcare professional. [address] any warranty or liability for your use of this information.

## 2019-08-23 NOTE — PROGRESS NOTES
1. Have you been to the ER, urgent care clinic since your last visit? Hospitalized since your last visit? No    2. Have you seen or consulted any other health care providers outside of the [address] [place] [address] since your last visit? Include any pap smears or colon screening.  No        Chief Complaint   Patient presents with    Follow-up

## 2019-08-24 LAB
FERRITIN SERPL-MCNC: 191 NG/ML (ref 8–388)
IRON SATN MFR SERPL: 32 %
IRON SERPL-MCNC: 90 UG/DL (ref 50–175)
TIBC SERPL-MCNC: 281 UG/DL (ref 250–450)

## 2019-11-26 ENCOUNTER — OFFICE VISIT (OUTPATIENT)
Dept: ONCOLOGY | Age: 74
End: 2019-11-26

## 2019-11-26 ENCOUNTER — HOSPITAL ENCOUNTER (OUTPATIENT)
Dept: LAB | Age: 74
Discharge: HOME | End: 2019-11-26
Payer: MEDICARE

## 2019-11-26 ENCOUNTER — HOSPITAL ENCOUNTER (OUTPATIENT)
Dept: ONCOLOGY | Age: 74
Discharge: HOME | End: 2019-11-26

## 2019-11-26 VITALS
HEART RATE: 82 BPM | RESPIRATION RATE: 16 BRPM | BODY MASS INDEX: 32.65 KG/M2 | HEIGHT: 65 IN | WEIGHT: 196 LBS | DIASTOLIC BLOOD PRESSURE: 70 MMHG | SYSTOLIC BLOOD PRESSURE: 149 MMHG | TEMPERATURE: 98.1 F | OXYGEN SATURATION: 99 %

## 2019-11-26 DIAGNOSIS — D75.839 THROMBOCYTOSIS: Primary | ICD-10-CM

## 2019-11-26 DIAGNOSIS — D50.8 IRON DEFICIENCY ANEMIA SECONDARY TO INADEQUATE DIETARY IRON INTAKE: ICD-10-CM

## 2019-11-26 DIAGNOSIS — D75.839 THROMBOCYTOSIS: ICD-10-CM

## 2019-11-26 LAB
ALBUMIN SERPL-MCNC: 4.1 G/DL (ref 3.4–5)
ALBUMIN/GLOB SERPL: 1.2 {RATIO} (ref 0.8–1.7)
ALP SERPL-CCNC: 90 U/L (ref 45–117)
ALT SERPL-CCNC: 29 U/L (ref 13–56)
ANION GAP SERPL CALC-SCNC: 3 MMOL/L (ref 3–18)
AST SERPL-CCNC: 24 U/L (ref 10–38)
BASO+EOS+MONOS # BLD AUTO: 0.7 K/UL (ref 0–2.3)
BASO+EOS+MONOS NFR BLD AUTO: 12 % (ref 0.1–17)
BILIRUB SERPL-MCNC: 0.4 MG/DL (ref 0.2–1)
BUN SERPL-MCNC: 18 MG/DL (ref 7–18)
BUN/CREAT SERPL: 13 (ref 12–20)
CALCIUM SERPL-MCNC: 9.9 MG/DL (ref 8.5–10.1)
CHLORIDE SERPL-SCNC: 107 MMOL/L (ref 100–111)
CO2 SERPL-SCNC: 31 MMOL/L (ref 21–32)
CREAT SERPL-MCNC: 1.35 MG/DL (ref 0.6–1.3)
DIFFERENTIAL METHOD BLD: ABNORMAL
ERYTHROCYTE [DISTWIDTH] IN BLOOD BY AUTOMATED COUNT: 13.8 % (ref 11.5–14.5)
GLOBULIN SER CALC-MCNC: 3.3 G/DL (ref 2–4)
GLUCOSE SERPL-MCNC: 111 MG/DL (ref 74–99)
HCT VFR BLD AUTO: 38.8 % (ref 36–48)
HGB BLD-MCNC: 11.9 G/DL (ref 12–16)
LYMPHOCYTES # BLD: 2.5 K/UL (ref 1.1–5.9)
LYMPHOCYTES NFR BLD: 43 % (ref 14–44)
MCH RBC QN AUTO: 25.9 PG (ref 25–35)
MCHC RBC AUTO-ENTMCNC: 30.7 G/DL (ref 31–37)
MCV RBC AUTO: 84.3 FL (ref 78–102)
NEUTS SEG # BLD: 2.7 K/UL (ref 1.8–9.5)
NEUTS SEG NFR BLD: 46 % (ref 40–70)
PLATELET # BLD AUTO: 417 K/UL (ref 140–440)
POTASSIUM SERPL-SCNC: 4.3 MMOL/L (ref 3.5–5.5)
PROT SERPL-MCNC: 7.4 G/DL (ref 6.4–8.2)
RBC # BLD AUTO: 4.6 M/UL (ref 4.1–5.1)
SODIUM SERPL-SCNC: 141 MMOL/L (ref 136–145)
WBC # BLD AUTO: 5.9 K/UL (ref 4.5–13)

## 2019-11-26 PROCEDURE — 82728 ASSAY OF FERRITIN: CPT

## 2019-11-26 PROCEDURE — 83540 ASSAY OF IRON: CPT

## 2019-11-26 PROCEDURE — 36415 COLL VENOUS BLD VENIPUNCTURE: CPT

## 2019-11-26 PROCEDURE — 80053 COMPREHEN METABOLIC PANEL: CPT

## 2019-11-26 NOTE — PATIENT INSTRUCTIONS
Iron Deficiency Anemia: Care Instructions Your Care Instructions Anemia means that you do not have enough red blood cells. Red blood cells carry oxygen around your body. When you have anemia, it can make you pale, weak, and tired. Many things can cause anemia. The most common cause is loss of blood. This can happen if you have heavy menstrual periods. It can also happen if you have bleeding in your stomach or bowel. You can also get anemia if you don't have enough iron in your diet or if it's hard for your body to absorb iron. In some cases, pregnancy causes anemia. That's because a pregnant woman needs more iron. Your doctor may do more tests to find the cause of your anemia. If a disease or other health problem is causing it, your doctor will treat that problem. It's important to follow up with your doctor to make sure that your iron level returns to normal. 
Follow-up care is a key part of your treatment and safety. Be sure to make and go to all appointments, and call your doctor if you are having problems. It's also a good idea to know your test results and keep a list of the medicines you take. How can you care for yourself at home? · If your doctor recommended iron pills, take them as directed. ? Try to take the pills on an empty stomach. You can do this about 1 hour before or 2 hours after meals. But you may need to take iron with food to avoid an upset stomach. ? Do not take antacids or drink milk or anything with caffeine within 2 hours of when you take your iron. They can keep your body from absorbing the iron well. ? Vitamin C helps your body absorb iron. You may want to take iron pills with a glass of orange juice or some other food high in vitamin C. 
? Iron pills may cause stomach problems. These include heartburn, nausea, diarrhea, constipation, and cramps. It can help to drink plenty of fluids and include fruits, vegetables, and fiber in your diet. ? It's normal for iron pills to make your stool a greenish or grayish black. But internal bleeding can also cause dark stool. So it's important to tell your doctor about any color changes. ? Call your doctor if you think you are having a problem with your iron pills. Even after you start to feel better, it will take several months for your body to build up its supply of iron. ? If you miss a pill, don't take a double dose. ? Keep iron pills out of the reach of small children. Too much iron can be very dangerous. · Eat foods with a lot of iron. These include red meat, shellfish, poultry, and eggs. They also include beans, raisins, whole-grain bread, and leafy green vegetables. · Steam your vegetables. This is the best way to prepare them if you want to get as much iron as possible. · Be safe with medicines. Do not take nonsteroidal anti-inflammatory pain relievers unless your doctor tells you to. These include aspirin, naproxen (Aleve), and ibuprofen (Advil, Motrin). · Liquid iron can stain your teeth. But you can mix it with water or juice and drink it with a straw. Then it won't get on your teeth. When should you call for help? Call 911 anytime you think you may need emergency care. For example, call if: 
  · You passed out (lost consciousness).  
 Call your doctor now or seek immediate medical care if: 
  · You are short of breath.  
  · You are dizzy or light-headed, or you feel like you may faint.  
  · You have new or worse bleeding.  
 Watch closely for changes in your health, and be sure to contact your doctor if: 
  · You feel weaker or more tired than usual.  
  · You do not get better as expected. Where can you learn more? Go to http://zoraida-oscar.info/. Enter P149 in the search box to learn more about \"Iron Deficiency Anemia: Care Instructions. \" Current as of: March 28, 2019 Content Version: 12.2 © 6763-7387 [employer].  Care instructions adapted under license by [address] (which disclaims liability or warranty for this information). If you have questions about a medical condition or this instruction, always ask your healthcare professional. [address] any warranty or liability for your use of this information.

## 2019-11-26 NOTE — PROGRESS NOTES
Hematology/Oncology  Progress Note    Name: Edin Brown  Date: 2019  : 1945    PCP: Yao Cobos MD     Ms. Luzmaria Higuera is a 69 y.o.  female who was seen for management of her reactive thrombocytosis and iron deficiency anemia. Current therapy: Oral iron supplement once daily. Subjective:     Mrs. Luzmaria Higuera is a 79-year-old woman who was seen for management of her reactive thrombocytosis and iron deficiency anemia. She was diagnosed in 2017. She states she is doing well. She continues to take the oral iron supplement once daily. She denies weakness, fatigue, and dizziness. She denies chest pain or any discomfort. She has no complaints of constipation or abdominal cramps. She denies having any blood in her stool or urine. The patient overall reports that she is doing good. She has no other complaints or concerns to report    Past medical history, family history, and social history: these were reviewed and remains unchanged.     Past Medical History:   Diagnosis Date    Anemia     Arthritis     Diabetes ([address]     Type 2    H/O seasonal allergies     High cholesterol     Hypertension      Past Surgical History:   Procedure Laterality Date    COLONOSCOPY N/A 2017    COLONOSCOPY performed by Hilary Muñoz MD at [facility]    HX CYST INCISION AND DRAINAGE Right     \"approx 30 years ago\"    HX HYSTERECTOMY      \"approx 30 years ago\"     Social History     Socioeconomic History    Marital status:      Spouse name: Not on file    Number of children: Not on file    Years of education: Not on file    Highest education level: Not on file   Occupational History    Not on file   Social Needs    Financial resource strain: Not on file    Food insecurity:     Worry: Not on file     Inability: Not on file    Transportation needs:     Medical: Not on file     Non-medical: Not on file   Tobacco Use    Smoking status: Never Smoker   Substance and Sexual Activity    Alcohol use: No    Drug use: No    Sexual activity: Not on file   Lifestyle    Physical activity:     Days per week: Not on file     Minutes per session: Not on file    Stress: Not on file   Relationships    Social connections:     Talks on phone: Not on file     Gets together: Not on file     Attends Gnosticist service: Not on file     Active member of club or organization: Not on file     Attends meetings of clubs or organizations: Not on file     Relationship status: Not on file    Intimate partner violence:     Fear of current or ex partner: Not on file     Emotionally abused: Not on file     Physically abused: Not on file     Forced sexual activity: Not on file   Other Topics Concern    Not on file   Social History Narrative    Not on file     Family History   Problem Relation Age of Onset    Diabetes Mother     Heart Disease Mother     Kidney Disease Mother     Cancer Father         h/o prostate cancer    Hypertension Father     Arthritis-osteo Father     Diabetes Sister     No Known Problems Brother     Diabetes Sister      Current Outpatient Medications   Medication Sig Dispense Refill    iron polysaccharides (FERREX 150) 150 mg iron capsule Take 1 Cap by mouth two (2) times a day. 30 Cap 6    metFORMIN (GLUCOPHAGE) 500 mg tablet Take  by mouth two (2) times daily (with meals).  losartan (COZAAR) 50 mg tablet Take 50 mg by mouth daily.  atorvastatin (LIPITOR) 40 mg tablet Take 40 mg by mouth nightly.  NIFEdipine ER (ADALAT CC) 60 mg ER tablet Take 60 mg by mouth daily.  ferrous sulfate 325 mg (65 mg iron) tablet Take  by mouth Daily (before breakfast).  fexofenadine (ALLEGRA) 180 mg tablet Take  by mouth daily as needed for Allergies.  meloxicam (MOBIC) 15 mg tablet Take 15 mg by mouth as needed for Pain. Review of Systems  Constitutional: The patient has no acute distress or discomfort.   HEENT: The patient denies recent head trauma, eye pain, blurred vision,  hearing deficit, oropharyngeal mucosal pain or lesions, and the patient denies throat pain or discomfort. Lymphatics: The patient denies palpable peripheral lymphadenopathy. Hematologic: The patient denies having bruising, bleeding, or progressive fatigue. Respiratory: Patient denies having shortness of breath, cough, sputum production, fever, or dyspnea on exertion. Cardiovascular: The patient denies having leg pain, leg swelling, heart palpitations, chest permit, chest pain, or lightheadedness. The patient denies having dyspnea on exertion. Gastrointestinal: The patient denies having nausea, emesis, or diarrhea. The patient denies having any hematemesis or blood in the stool. Genitourinary: Patient denies having urinary urgency, frequency, or dysuria. The patient denies having blood in the urine. Psychological: The patient denies having symptoms of nervousness, anxiety, depression, or thoughts of harming self. Skin: Patient denies having skin rashes, skin, ulcerations, or unexplained itching or pruritus. Musculoskeletal: The patient denies having pain in the joints or bones. Objective:     Visit Vitals  /70   Pulse 82   Temp 98.1 °F (36.7 °C) (Oral)   Resp 16   Ht 5' 5\" (1.651 m)   Wt 88.9 kg (196 lb)   SpO2 99%   BMI 32.62 kg/m²     Physical Exam:   Gen. Appearance: The patient is in no acute distress. Skin: There is no bruise or rash. HEENT: The exam is unremarkable. Neck: Supple without lymphadenopathy or thyromegaly. Lungs: Clear to auscultation and percussion; there are no wheezes or rhonchi. Heart: Regular rate and rhythm; there are no murmurs, gallops, or rubs. Abdomen: Bowel sounds are present and normal.  There is no guarding, tenderness, or hepatosplenomegaly. Extremities: There is no clubbing, cyanosis, or edema. Neurologic: There are no focal neurologic deficits. Lymphatics: There is no palpable peripheral lymphadenopathy.  Musculoskeletal: The patient has full range of motion at all joints. There is no evidence of joint deformity or effusions. There is no focal joint tenderness. Psychological/psychiatric: There is no clinical evidence of anxiety, depression, or melancholy. Lab data:      Results for orders placed or performed during the hospital encounter of 11/26/19   CBC WITH 3 PART DIFF     Status: Abnormal   Result Value Ref Range Status    WBC 5.9 4.5 - 13.0 K/uL Final    RBC 4.60 4.10 - 5.10 M/uL Final    HGB 11.9 (L) 12.0 - 16.0 g/dL Final    HCT 38.8 36 - 48 % Final    MCV 84.3 78 - 102 FL Final    MCH 25.9 25.0 - 35.0 PG Final    MCHC 30.7 (L) 31 - 37 g/dL Final    RDW 13.8 11.5 - 14.5 % Final    PLATELET 188 858 - 260 K/uL Final    NEUTROPHILS 46 40 - 70 % Final    MIXED CELLS 12 0.1 - 17 % Final    LYMPHOCYTES 43 14 - 44 % Final    ABS. NEUTROPHILS 2.7 1.8 - 9.5 K/UL Final    ABS. MIXED CELLS 0.7 0.0 - 2.3 K/uL Final    ABS. LYMPHOCYTES 2.5 1.1 - 5.9 K/UL Final     Comment: Test performed at [address] [place] [address] or Outpatient Infusion Center Location. Reviewed by Medical Director. DF AUTOMATED   Final           Assessment:     1. Thrombocytosis ([address]    2. Iron deficiency anemia secondary to inadequate dietary iron intake      Plan:   Reactive thrombocytosis: I have explained to the patient that her CBC today  shows that her platelet count is normal at 417,000. We will continue to monitor this at 3-month intervals. Iron deficiency anemia: Today her CBC shows her hemoglobin is stable at 11.9g/dL with hematocrit of 38.8%. Her Ferritin level on 08/2019 was normal at 191ng/mL. Her Iron level was 90ug/dL with Iron % saturation of 32%. Iron Profile and Ferritin level will be obtained at this time. Follow-up in 3 months or sooner if indicated.       Orders Placed This Encounter    COMPLETE CBC & AUTO DIFF WBC    InHouse CBC ([facility])     Standing Status:   Future     Number of Occurrences:   1 Standing Expiration Date:   12/3/2019    IRON PROFILE     Standing Status:   Future     Standing Expiration Date:   71/52/2735    METABOLIC PANEL, COMPREHENSIVE     Standing Status:   Future     Standing Expiration Date:   11/26/2020    FERRITIN     Standing Status:   Future     Standing Expiration Date:   11/26/2020         Damian Cristina NP  11/26/2019    I have assessed the patient independently and  agree with the full assessment as outlined.   Vickie Barbosa MD, [place]

## 2019-11-27 LAB
FERRITIN SERPL-MCNC: 221 NG/ML (ref 8–388)
IRON SATN MFR SERPL: 32 %
IRON SERPL-MCNC: 90 UG/DL (ref 50–175)
TIBC SERPL-MCNC: 282 UG/DL (ref 250–450)

## 2020-02-27 ENCOUNTER — HOSPITAL ENCOUNTER (OUTPATIENT)
Dept: MAMMOGRAPHY | Age: 75
Discharge: HOME | End: 2020-02-27
Attending: FAMILY MEDICINE
Payer: MEDICARE

## 2020-02-27 DIAGNOSIS — Z12.31 VISIT FOR SCREENING MAMMOGRAM: ICD-10-CM

## 2020-02-27 PROCEDURE — 77063 BREAST TOMOSYNTHESIS BI: CPT

## 2020-02-28 ENCOUNTER — OFFICE VISIT (OUTPATIENT)
Dept: ONCOLOGY | Age: 75
End: 2020-02-28

## 2020-02-28 ENCOUNTER — HOSPITAL ENCOUNTER (OUTPATIENT)
Dept: LAB | Age: 75
Discharge: HOME | End: 2020-02-28
Payer: MEDICARE

## 2020-02-28 ENCOUNTER — HOSPITAL ENCOUNTER (OUTPATIENT)
Dept: ONCOLOGY | Age: 75
Discharge: HOME | End: 2020-02-28

## 2020-02-28 VITALS
HEIGHT: 65 IN | RESPIRATION RATE: 16 BRPM | WEIGHT: 193 LBS | SYSTOLIC BLOOD PRESSURE: 139 MMHG | OXYGEN SATURATION: 98 % | BODY MASS INDEX: 32.15 KG/M2 | HEART RATE: 91 BPM | DIASTOLIC BLOOD PRESSURE: 72 MMHG | TEMPERATURE: 98.3 F

## 2020-02-28 DIAGNOSIS — D50.8 IRON DEFICIENCY ANEMIA SECONDARY TO INADEQUATE DIETARY IRON INTAKE: ICD-10-CM

## 2020-02-28 DIAGNOSIS — D75.839 THROMBOCYTOSIS: ICD-10-CM

## 2020-02-28 DIAGNOSIS — D75.839 THROMBOCYTOSIS: Primary | ICD-10-CM

## 2020-02-28 LAB
ALBUMIN SERPL-MCNC: 4.1 G/DL (ref 3.4–5)
ALBUMIN/GLOB SERPL: 1.2 {RATIO} (ref 0.8–1.7)
ALP SERPL-CCNC: 91 U/L (ref 45–117)
ALT SERPL-CCNC: 33 U/L (ref 13–56)
ANION GAP SERPL CALC-SCNC: 7 MMOL/L (ref 3–18)
AST SERPL-CCNC: 25 U/L (ref 10–38)
BASO+EOS+MONOS # BLD AUTO: 0.6 K/UL (ref 0–2.3)
BASO+EOS+MONOS NFR BLD AUTO: 9 % (ref 0.1–17)
BILIRUB SERPL-MCNC: 0.4 MG/DL (ref 0.2–1)
BUN SERPL-MCNC: 20 MG/DL (ref 7–18)
BUN/CREAT SERPL: 16 (ref 12–20)
CALCIUM SERPL-MCNC: 9.2 MG/DL (ref 8.5–10.1)
CHLORIDE SERPL-SCNC: 108 MMOL/L (ref 100–111)
CO2 SERPL-SCNC: 27 MMOL/L (ref 21–32)
CREAT SERPL-MCNC: 1.28 MG/DL (ref 0.6–1.3)
DIFFERENTIAL METHOD BLD: NORMAL
ERYTHROCYTE [DISTWIDTH] IN BLOOD BY AUTOMATED COUNT: 13.6 % (ref 11.5–14.5)
FERRITIN SERPL-MCNC: 224 NG/ML (ref 8–388)
GLOBULIN SER CALC-MCNC: 3.4 G/DL (ref 2–4)
GLUCOSE SERPL-MCNC: 147 MG/DL (ref 74–99)
HCT VFR BLD AUTO: 40.4 % (ref 36–48)
HGB BLD-MCNC: 12.6 G/DL (ref 12–16)
IRON SATN MFR SERPL: 27 % (ref 20–50)
IRON SERPL-MCNC: 82 UG/DL (ref 50–175)
LYMPHOCYTES # BLD: 2.7 K/UL (ref 1.1–5.9)
LYMPHOCYTES NFR BLD: 43 % (ref 14–44)
MCH RBC QN AUTO: 26.3 PG (ref 25–35)
MCHC RBC AUTO-ENTMCNC: 31.2 G/DL (ref 31–37)
MCV RBC AUTO: 84.2 FL (ref 78–102)
NEUTS SEG # BLD: 3 K/UL (ref 1.8–9.5)
NEUTS SEG NFR BLD: 48 % (ref 40–70)
PLATELET # BLD AUTO: 422 K/UL (ref 140–440)
POTASSIUM SERPL-SCNC: 3.9 MMOL/L (ref 3.5–5.5)
PROT SERPL-MCNC: 7.5 G/DL (ref 6.4–8.2)
RBC # BLD AUTO: 4.8 M/UL (ref 4.1–5.1)
SODIUM SERPL-SCNC: 142 MMOL/L (ref 136–145)
TIBC SERPL-MCNC: 304 UG/DL (ref 250–450)
WBC # BLD AUTO: 6.3 K/UL (ref 4.5–13)

## 2020-02-28 PROCEDURE — 82728 ASSAY OF FERRITIN: CPT

## 2020-02-28 PROCEDURE — 80053 COMPREHEN METABOLIC PANEL: CPT

## 2020-02-28 PROCEDURE — 83540 ASSAY OF IRON: CPT

## 2020-02-28 PROCEDURE — 36415 COLL VENOUS BLD VENIPUNCTURE: CPT

## 2020-02-28 NOTE — PROGRESS NOTES
Identified pt with two pt identifiers(name and ). Reviewed record in preparation for visit and have obtained necessary documentation. Chief Complaint   Patient presents with    Follow-up     3 MONTH        Health Maintenance Due   Topic    Hepatitis C Screening     DTaP/Tdap/Td series (1 - Tdap)    Shingrix Vaccine Age 50> (1 of 2)    FOBT Q1Y Age 54-65    [facility] Lipid Screen     GLAUCOMA SCREENING Q2Y     Pneumococcal 65+ years (1 of 1 - PPSV23)    Medicare Yearly Exam     Influenza Age 5 to Adult        Coordination of Care Questionnaire:  :   1) Have you been to an emergency room, urgent care, or hospitalized since your last visit? If yes, where when, and reason for visit? no       2. Have seen or consulted any other health care provider since your last visit? If yes, where when, and reason for visit? NO      3) Do you have an Advanced Directive/ Living Will in place? NO  If yes, do we have a copy on file NO  If no, would you like information NO      Learning Assessment 2019   PRIMARY LEARNER Patient   PRIMARY LANGUAGE ENGLISH   LEARNER PREFERENCE PRIMARY LISTENING     DEMONSTRATION   ANSWERED BY patient   RELATIONSHIP SELF        3 most recent PHQ Screens 2020   Little interest or pleasure in doing things Not at all   Feeling down, depressed, irritable, or hopeless Not at all   Total Score PHQ 2 0        Abuse Screening Questionnaire 2019   Do you ever feel afraid of your partner? N   Are you in a relationship with someone who physically or mentally threatens you? N   Is it safe for you to go home? Y        Fall Risk Assessment, last 12 mths 2020   Able to walk? Yes   Fall in past 12 months?  No

## 2020-02-28 NOTE — PATIENT INSTRUCTIONS

## 2020-06-03 ENCOUNTER — VIRTUAL VISIT (OUTPATIENT)
Dept: ONCOLOGY | Age: 75
End: 2020-06-03

## 2020-06-03 VITALS
HEIGHT: 65 IN | WEIGHT: 190 LBS | TEMPERATURE: 98 F | SYSTOLIC BLOOD PRESSURE: 128 MMHG | DIASTOLIC BLOOD PRESSURE: 80 MMHG | BODY MASS INDEX: 31.65 KG/M2

## 2020-06-03 DIAGNOSIS — D75.839 THROMBOCYTOSIS: ICD-10-CM

## 2020-06-03 DIAGNOSIS — D50.8 IRON DEFICIENCY ANEMIA SECONDARY TO INADEQUATE DIETARY IRON INTAKE: Primary | ICD-10-CM

## 2020-06-03 NOTE — PROGRESS NOTES
Briseida Lennon is a 69 y.o. female evaluated via audio only technology on 6/3/2020. Consent: She and/or her health care decision maker is aware that she may receive a bill for this audio only encounter, depending on her insurance coverage, and has provided verbal consent to proceed: Yes    Attending: Dr. Trenton Gabriel:   1. Iron deficiency anemia secondary to inadequate dietary iron intake  *02/28/2020: CBC showed a WBC count of 6.3K/uL, hemoglobin was 12.6g/dL, hematocrit 40.4%, and the platelet count was 213,793. *Iron profile showed an iron level of 82mcg/dL and an iron saturation of 27%. *Ferritin level was 224ng/mL. *Kidney function showed a BUN of 20mg/dL and a creatinine of 1.28mg/dL. *Continue follow up with PCP every 3 months as recommended  *Will continue to monitor    2. Thrombocytosis ([address]  *02/28/2020: CBC showed a WBC count of 6.3K/uL, hemoglobin was 12.6g/dL, hematocrit 40.4%, and the platelet count was 532,344. Subjective:   Briseida Lennon is a 69 y.o. female who evaluated via audio only technology. I communicated with the patient and/or health care decision maker about the ongoing management of her Reactive Thrombocytosis and Iron Deficiency Anemia. She reports she was seen by her PCP on May 15, 2020 and her Eye Doctor on May 20, 2020. She states both of her appointments went well. No concerns at all. She further reports she will follow up in 3 months with her PCP and 6 months with her Eye Doctor. I informed the patient that at her last clinic visit on 02/28/2020 her CBC showed a WBC count of 6.3K/uL, hemoglobin was 12.6g/dL, hematocrit 40.4%, and the platelet count was 869,215. Her iron profile showed an iron level of 82mcg/dL and an iron saturation of 27%. Her ferritin level was 224ng/mL. Her kidney function showed a BUN of 20mg/dL and a creatinine of 1.28mg/dL. She denies shortness of breath, weakness, and fatigue.  She denies fevers, infections, and weight loss. She denies pain or any discomfort. She has no new complaints or concerns to report. We will schedule her lab tests 1 week prior to her next follow up appointment. The patient had her questions answered to her satisfaction. Follow up in 4 months or sooner if indicated. Prior to Admission medications    Medication Sig Start Date End Date Taking? Authorizing Provider   iron polysaccharides (FERREX 150) 150 mg iron capsule Take 1 Cap by mouth two (2) times a day. 5/19/19   Dayron Chang MD   metFORMIN (GLUCOPHAGE) 500 mg tablet Take  by mouth two (2) times daily (with meals). Provider, Historical   losartan (COZAAR) 50 mg tablet Take 50 mg by mouth daily. Provider, Historical   atorvastatin (LIPITOR) 40 mg tablet Take 40 mg by mouth nightly. Provider, Historical   NIFEdipine ER (ADALAT CC) 60 mg ER tablet Take 60 mg by mouth daily. Provider, Historical   ferrous sulfate 325 mg (65 mg iron) tablet Take  by mouth Daily (before breakfast). Provider, Historical   fexofenadine (ALLEGRA) 180 mg tablet Take  by mouth daily as needed for Allergies. Provider, Historical   meloxicam (MOBIC) 15 mg tablet Take 15 mg by mouth as needed for Pain.     Provider, Historical     Allergies   Allergen Reactions    Lisinopril Cough    Tape [Adhesive] Rash     Itching at site     Lab Results   Component Value Date/Time    WBC 6.3 02/28/2020 10:45 AM    HGB 12.6 02/28/2020 10:45 AM    HCT 40.4 02/28/2020 10:45 AM    PLATELET 001 60/58/4746 10:45 AM    MCV 84.2 02/28/2020 10:45 AM     Lab Results   Component Value Date/Time    Sodium 142 02/28/2020 10:45 AM    Potassium 3.9 02/28/2020 10:45 AM    Chloride 108 02/28/2020 10:45 AM    CO2 27 02/28/2020 10:45 AM    Anion gap 7 02/28/2020 10:45 AM    Glucose 147 (H) 02/28/2020 10:45 AM    BUN 20 (H) 02/28/2020 10:45 AM    Creatinine 1.28 02/28/2020 10:45 AM    BUN/Creatinine ratio 16 02/28/2020 10:45 AM    GFR est AA 50 (L) 02/28/2020 10:45 AM GFR est non-AA 41 (L) 02/28/2020 10:45 AM    Calcium 9.2 02/28/2020 10:45 AM    Bilirubin, total 0.4 02/28/2020 10:45 AM    Alk. phosphatase 91 02/28/2020 10:45 AM    Protein, total 7.5 02/28/2020 10:45 AM    Albumin 4.1 02/28/2020 10:45 AM    Globulin 3.4 02/28/2020 10:45 AM    A-G Ratio 1.2 02/28/2020 10:45 AM    ALT (SGPT) 33 02/28/2020 10:45 AM     Lab Results   Component Value Date/Time    Iron 82 02/28/2020 10:45 AM    TIBC 304 02/28/2020 10:45 AM    Iron % saturation 27 02/28/2020 10:45 AM    Ferritin 224 02/28/2020 10:45 AM         I affirm this is a Patient-Initiated Episode with a Patient who has not had a related appointment within my department in the past 7 days or scheduled within the next 24 hours.     Total Time: minutes: 21-30 minutes  Follow up with Dr. Hoa Garcia in 4 months or sooner if indicated      Orders Placed This Encounter    CBC WITH 3 PART DIFF     Standing Status:   Future     Standing Expiration Date:   04/0/7622    METABOLIC PANEL, COMPREHENSIVE     Standing Status:   Future     Standing Expiration Date:   6/4/2021    IRON PROFILE     Standing Status:   Future     Standing Expiration Date:   6/4/2021    FERRITIN     Standing Status:   Future     Standing Expiration Date:   6/4/2021       [facility]  06/03/2020

## 2020-10-01 NOTE — PROGRESS NOTES
Hematology/Oncology  Progress Note    Name: Jelani Leyva    :     PCP: Violet Glez MD     Ms. Kobe Landeros is a 70 y.o.  female who was seen for management of her reactive thrombocytosis and iron deficiency anemia. Current therapy: Oral iron supplement once daily. Subjective:     Mrs. Kobe Landeros is a 22-year-old woman who was seen for management of her reactive thrombocytosis and iron deficiency anemia. She was diagnosed in 2017. She continues to take the oral iron supplement once daily. Since last visit, she states she is doing well. She denies weakness, fatigue, and dizziness. She denies chest pain or any discomfort. She has no complaints of constipation or abdominal cramps. She denies having any blood in her stool or urine. The patient overall reports that she is doing good. She has no other complaints or concerns to report    Past medical history, family history, and social history: these were reviewed and remains unchanged.     Past Medical History:   Diagnosis Date    Anemia     Arthritis     Diabetes ([address]     Type 2    H/O seasonal allergies     High cholesterol     Hypertension      Past Surgical History:   Procedure Laterality Date    COLONOSCOPY N/A 2017    [facility] at [facility]    HX CYST INCISION AND DRAINAGE Right     \"approx 30 years ago\"    HX HYSTERECTOMY      \"approx 30 years ago\"     Social History     Socioeconomic History    Marital status:      Spouse name: Not on file    Number of children: Not on file    Years of education: Not on file    Highest education level: Not on file   Occupational History    Not on file   Social Needs    Financial resource strain: Not on file    Food insecurity     Worry: Not on file     Inability: Not on file    Transportation needs     Medical: Not on file     Non-medical: Not on file   Tobacco Use    Smoking status: Never Smoker    Smokeless tobacco: Never Used   Substance and Sexual Activity    Alcohol use: No    Drug use: No    Sexual activity: Not on file   Lifestyle    Physical activity     Days per week: Not on file     Minutes per session: Not on file    Stress: Not on file   Relationships    Social connections     Talks on phone: Not on file     Gets together: Not on file     Attends Congregational service: Not on file     Active member of club or organization: Not on file     Attends meetings of clubs or organizations: Not on file     Relationship status: Not on file    Intimate partner violence     Fear of current or ex partner: Not on file     Emotionally abused: Not on file     Physically abused: Not on file     Forced sexual activity: Not on file   Other Topics Concern    Not on file   Social History Narrative    Not on file     Family History   Problem Relation Age of Onset    Diabetes Mother     Heart Disease Mother     Kidney Disease Mother     Cancer Father         h/o prostate cancer    Hypertension Father     Arthritis-osteo Father     Diabetes Sister     No Known Problems Brother     Diabetes Sister      Current Outpatient Medications   Medication Sig Dispense Refill    iron polysaccharides (FERREX 150) 150 mg iron capsule Take 1 Cap by mouth two (2) times a day. 30 Cap 6    metFORMIN (GLUCOPHAGE) 500 mg tablet Take  by mouth two (2) times daily (with meals).  losartan (COZAAR) 50 mg tablet Take 50 mg by mouth daily.  atorvastatin (LIPITOR) 40 mg tablet Take 40 mg by mouth nightly.  NIFEdipine ER (ADALAT CC) 60 mg ER tablet Take 60 mg by mouth daily.  ferrous sulfate 325 mg (65 mg iron) tablet Take  by mouth Daily (before breakfast).  fexofenadine (ALLEGRA) 180 mg tablet Take  by mouth daily as needed for Allergies.  meloxicam (MOBIC) 15 mg tablet Take 15 mg by mouth as needed for Pain. Review of Systems   Constitutional: Negative for chills, diaphoresis, fever, malaise/fatigue and weight loss. Respiratory: Negative for cough, hemoptysis, shortness of breath and wheezing. Cardiovascular: Negative for chest pain, palpitations and leg swelling. Gastrointestinal: Negative for abdominal pain, diarrhea, heartburn, nausea and vomiting. Genitourinary: Negative for dysuria, frequency, hematuria and urgency. Musculoskeletal: Negative for joint pain and myalgias. Skin: Negative for itching and rash. Neurological: Negative for dizziness, seizures, weakness and headaches. Psychiatric/Behavioral: Negative for depression. The patient does not have insomnia. Objective:     Visit Vitals  BP (!) 145/77   Pulse 74   Resp 16   Ht 5' 5\" (1.651 m)   Wt 84.4 kg (186 lb)   SpO2 100%   BMI 30.95 kg/m²       ECOG Performance Status (grade): 0  0 - able to carry on all pre-disease activity w/out restriction  1 - restricted but able to carry out light work  2 - ambulatory and can self- care but unable to carry out work  3 - bed or chair >50% of waking hours  4 - completely disable, total care, confined to bed or chair    Physical Exam  Constitutional:       Appearance: Normal appearance. HENT:      Head: Normocephalic and atraumatic. Eyes:      Pupils: Pupils are equal, round, and reactive to light. Neck:      Musculoskeletal: Neck supple. Cardiovascular:      Rate and Rhythm: Normal rate and regular rhythm. Heart sounds: Normal heart sounds. Pulmonary:      Effort: Pulmonary effort is normal.      Breath sounds: Normal breath sounds. Abdominal:      General: Bowel sounds are normal.      Palpations: Abdomen is soft. Tenderness: There is no abdominal tenderness. There is no guarding. Musculoskeletal: Normal range of motion. Right lower leg: No edema. Left lower leg: No edema. Skin:     General: Skin is warm. Neurological:      General: No focal deficit present. Mental Status: She is alert and oriented to person, place, and time. Mental status is at baseline. Diagnostics:      No results found for this or any previous visit (from the past 96 hour(s)). Imaging:  No results found for this or any previous visit. Results for orders placed during the hospital encounter of 09/16/11   XR CLAVICLE RIGHT    Narrative Ordering MD: Rayshawn Membreno PA-C  Signed By: Elizabeth Li MD  ** FINAL **  ---------------------------------------------------------------------  Procedure Date:  09/16/2011   Accession Number:  [id number]           Order No:   [id number]         Procedure:   RDV - CLAVICLE RT        CPT Code:   92400      Admit Diag:   RIGHT SHOULDER AND COLLAR              Reason:   PAIN  INTERPRETATION:  INDICATION:  Pain  COMPARISON: None. FINDINGS: Two views of the right clavicle demonstrate no fracture. The joint relationships are normal. No lytic or blastic lesions   demonstrated. Soft tissues are unremarkable. The demonstrated right   lung is clear. IMPRESSION: Normal right clavicle. No results found for this or any previous visit. Assessment:     1. Iron deficiency anemia secondary to inadequate dietary iron intake    2. Thrombocytosis ([address]      Plan:   Reactive thrombocytosis:   --  We will continue to monitor CBC today and  at 6-month intervals. Iron deficiency anemia:   -- She was diagnosed in July of 2017. She continues to take the oral iron supplement once daily. -- She reported her last colonoscopy done in 2 years ago, next colonoscopy will be in  3 years per patient. -- We will monitor CBC, CMP, Iron profile, ferritin. -- We will see the patient back in clinic in about 6 months. Always sooner if required. The patient can have lab done prior our next clinic visit.       Orders Placed This Encounter    CBC WITH AUTOMATED DIFF     Standing Status:   Future     Standing Expiration Date:   17/7/5385    METABOLIC PANEL, COMPREHENSIVE     Standing Status:   Future     Standing Expiration Date:   10/3/2021    FERRITIN     Standing Status:   Future     Standing Expiration Date:   10/3/2021    IRON PROFILE     Standing Status:   Future     Standing Expiration Date:   10/3/2021           Ms. Candace Chambers has a reminder for a \"due or due soon\" health maintenance. I have asked that she contact her primary care provider for follow-up on this health maintenance. All of patient's questions answered to their apparent satisfaction. They verbally show understanding and agreement with aforementioned plan. Jyotsna Foster MD          About 25 minutes were spent for this encounter with more than 50% of the time spent in face-to-face counseling, discussing on diagnosis and management plan going forward, and co-ordination of care. Parts of this document has been produced using Dragon dictation system. Unrecognized errors in transcription may be present. Please do not hesitate to reach out for any questions or clarifications.       CC: Jane Lyman MD

## 2020-10-02 ENCOUNTER — OFFICE VISIT (OUTPATIENT)
Dept: ONCOLOGY | Age: 75
End: 2020-10-02
Payer: MEDICARE

## 2020-10-02 ENCOUNTER — HOSPITAL ENCOUNTER (OUTPATIENT)
Dept: LAB | Age: 75
Discharge: HOME | End: 2020-10-02
Payer: MEDICARE

## 2020-10-02 VITALS
DIASTOLIC BLOOD PRESSURE: 77 MMHG | HEART RATE: 74 BPM | WEIGHT: 186 LBS | HEIGHT: 65 IN | OXYGEN SATURATION: 100 % | SYSTOLIC BLOOD PRESSURE: 145 MMHG | BODY MASS INDEX: 30.99 KG/M2 | RESPIRATION RATE: 16 BRPM

## 2020-10-02 DIAGNOSIS — D50.8 IRON DEFICIENCY ANEMIA SECONDARY TO INADEQUATE DIETARY IRON INTAKE: Primary | ICD-10-CM

## 2020-10-02 DIAGNOSIS — D75.839 THROMBOCYTOSIS: ICD-10-CM

## 2020-10-02 DIAGNOSIS — D50.8 IRON DEFICIENCY ANEMIA SECONDARY TO INADEQUATE DIETARY IRON INTAKE: ICD-10-CM

## 2020-10-02 LAB
ALBUMIN SERPL-MCNC: 4.1 G/DL (ref 3.4–5)
ALBUMIN/GLOB SERPL: 1.2 {RATIO} (ref 0.8–1.7)
ALP SERPL-CCNC: 90 U/L (ref 45–117)
ALT SERPL-CCNC: 35 U/L (ref 13–56)
ANION GAP SERPL CALC-SCNC: 5 MMOL/L (ref 3–18)
AST SERPL-CCNC: 27 U/L (ref 10–38)
BASOPHILS # BLD: 0 K/UL (ref 0–0.1)
BASOPHILS NFR BLD: 1 % (ref 0–2)
BILIRUB SERPL-MCNC: 0.4 MG/DL (ref 0.2–1)
BUN SERPL-MCNC: 22 MG/DL (ref 7–18)
BUN/CREAT SERPL: 19 (ref 12–20)
CALCIUM SERPL-MCNC: 9.8 MG/DL (ref 8.5–10.1)
CHLORIDE SERPL-SCNC: 107 MMOL/L (ref 100–111)
CO2 SERPL-SCNC: 30 MMOL/L (ref 21–32)
CREAT SERPL-MCNC: 1.17 MG/DL (ref 0.6–1.3)
DIFFERENTIAL METHOD BLD: ABNORMAL
EOSINOPHIL # BLD: 0.4 K/UL (ref 0–0.4)
EOSINOPHIL NFR BLD: 6 % (ref 0–5)
ERYTHROCYTE [DISTWIDTH] IN BLOOD BY AUTOMATED COUNT: 14.4 % (ref 11.6–14.5)
FERRITIN SERPL-MCNC: 277 NG/ML (ref 8–388)
GLOBULIN SER CALC-MCNC: 3.4 G/DL (ref 2–4)
GLUCOSE SERPL-MCNC: 97 MG/DL (ref 74–99)
HCT VFR BLD AUTO: 37.5 % (ref 35–45)
HGB BLD-MCNC: 11.8 G/DL (ref 12–16)
IRON SATN MFR SERPL: 25 % (ref 20–50)
IRON SERPL-MCNC: 76 UG/DL (ref 50–175)
LYMPHOCYTES # BLD: 2.5 K/UL (ref 0.9–3.6)
LYMPHOCYTES NFR BLD: 42 % (ref 21–52)
MCH RBC QN AUTO: 25.8 PG (ref 24–34)
MCHC RBC AUTO-ENTMCNC: 31.5 G/DL (ref 31–37)
MCV RBC AUTO: 82.1 FL (ref 74–97)
MONOCYTES # BLD: 0.4 K/UL (ref 0.05–1.2)
MONOCYTES NFR BLD: 7 % (ref 3–10)
NEUTS SEG # BLD: 2.7 K/UL (ref 1.8–8)
NEUTS SEG NFR BLD: 44 % (ref 40–73)
PLATELET # BLD AUTO: 395 K/UL (ref 135–420)
PMV BLD AUTO: 9.3 FL (ref 9.2–11.8)
POTASSIUM SERPL-SCNC: 4.5 MMOL/L (ref 3.5–5.5)
PROT SERPL-MCNC: 7.5 G/DL (ref 6.4–8.2)
RBC # BLD AUTO: 4.57 M/UL (ref 4.2–5.3)
SODIUM SERPL-SCNC: 142 MMOL/L (ref 136–145)
TIBC SERPL-MCNC: 307 UG/DL (ref 250–450)
WBC # BLD AUTO: 6 K/UL (ref 4.6–13.2)

## 2020-10-02 PROCEDURE — 80053 COMPREHEN METABOLIC PANEL: CPT

## 2020-10-02 PROCEDURE — 85025 COMPLETE CBC W/AUTO DIFF WBC: CPT

## 2020-10-02 PROCEDURE — 83540 ASSAY OF IRON: CPT

## 2020-10-02 PROCEDURE — 99214 OFFICE O/P EST MOD 30 MIN: CPT | Performed by: INTERNAL MEDICINE

## 2020-10-02 PROCEDURE — 36415 COLL VENOUS BLD VENIPUNCTURE: CPT

## 2020-10-02 PROCEDURE — 82728 ASSAY OF FERRITIN: CPT

## 2020-10-02 PROCEDURE — G0463 HOSPITAL OUTPT CLINIC VISIT: HCPCS | Performed by: INTERNAL MEDICINE

## 2021-02-18 ENCOUNTER — TRANSCRIBE ORDER (OUTPATIENT)
Dept: SCHEDULING | Age: 76
End: 2021-02-18

## 2021-02-18 DIAGNOSIS — Z12.31 VISIT FOR SCREENING MAMMOGRAM: Primary | ICD-10-CM

## 2021-03-26 ENCOUNTER — APPOINTMENT (OUTPATIENT)
Dept: ONCOLOGY | Age: 76
End: 2021-03-26

## 2021-03-26 ENCOUNTER — HOSPITAL ENCOUNTER (OUTPATIENT)
Dept: LAB | Age: 76
Discharge: HOME | End: 2021-03-26
Payer: MEDICARE

## 2021-03-26 DIAGNOSIS — D50.8 IRON DEFICIENCY ANEMIA SECONDARY TO INADEQUATE DIETARY IRON INTAKE: ICD-10-CM

## 2021-03-26 LAB
ALBUMIN SERPL-MCNC: 3.9 G/DL (ref 3.4–5)
ALBUMIN/GLOB SERPL: 1.3 {RATIO} (ref 0.8–1.7)
ALP SERPL-CCNC: 76 U/L (ref 45–117)
ALT SERPL-CCNC: 31 U/L (ref 13–56)
ANION GAP SERPL CALC-SCNC: 6 MMOL/L (ref 3–18)
AST SERPL-CCNC: 28 U/L (ref 10–38)
BASOPHILS # BLD: 0.1 K/UL (ref 0–0.1)
BASOPHILS NFR BLD: 1 % (ref 0–2)
BILIRUB SERPL-MCNC: 0.3 MG/DL (ref 0.2–1)
BUN SERPL-MCNC: 19 MG/DL (ref 7–18)
BUN/CREAT SERPL: 18 (ref 12–20)
CALCIUM SERPL-MCNC: 8.9 MG/DL (ref 8.5–10.1)
CHLORIDE SERPL-SCNC: 108 MMOL/L (ref 100–111)
CO2 SERPL-SCNC: 28 MMOL/L (ref 21–32)
CREAT SERPL-MCNC: 1.06 MG/DL (ref 0.6–1.3)
DIFFERENTIAL METHOD BLD: ABNORMAL
EOSINOPHIL # BLD: 0.4 K/UL (ref 0–0.4)
EOSINOPHIL NFR BLD: 8 % (ref 0–5)
ERYTHROCYTE [DISTWIDTH] IN BLOOD BY AUTOMATED COUNT: 14.1 % (ref 11.6–14.5)
FERRITIN SERPL-MCNC: 306 NG/ML (ref 8–388)
GLOBULIN SER CALC-MCNC: 2.9 G/DL (ref 2–4)
GLUCOSE SERPL-MCNC: 116 MG/DL (ref 74–99)
HCT VFR BLD AUTO: 36.5 % (ref 35–45)
HGB BLD-MCNC: 11.4 G/DL (ref 12–16)
IRON SATN MFR SERPL: 26 % (ref 20–50)
IRON SERPL-MCNC: 73 UG/DL (ref 50–175)
LYMPHOCYTES # BLD: 1.9 K/UL (ref 0.9–3.6)
LYMPHOCYTES NFR BLD: 41 % (ref 21–52)
MCH RBC QN AUTO: 25.7 PG (ref 24–34)
MCHC RBC AUTO-ENTMCNC: 31.2 G/DL (ref 31–37)
MCV RBC AUTO: 82.2 FL (ref 74–97)
MONOCYTES # BLD: 0.5 K/UL (ref 0.05–1.2)
MONOCYTES NFR BLD: 10 % (ref 3–10)
NEUTS SEG # BLD: 1.9 K/UL (ref 1.8–8)
NEUTS SEG NFR BLD: 40 % (ref 40–73)
PLATELET # BLD AUTO: 384 K/UL (ref 135–420)
PMV BLD AUTO: 9.3 FL (ref 9.2–11.8)
POTASSIUM SERPL-SCNC: 3.8 MMOL/L (ref 3.5–5.5)
PROT SERPL-MCNC: 6.8 G/DL (ref 6.4–8.2)
RBC # BLD AUTO: 4.44 M/UL (ref 4.2–5.3)
SODIUM SERPL-SCNC: 142 MMOL/L (ref 136–145)
TIBC SERPL-MCNC: 281 UG/DL (ref 250–450)
WBC # BLD AUTO: 4.6 K/UL (ref 4.6–13.2)

## 2021-03-26 PROCEDURE — 82728 ASSAY OF FERRITIN: CPT

## 2021-03-26 PROCEDURE — 80053 COMPREHEN METABOLIC PANEL: CPT

## 2021-03-26 PROCEDURE — 83550 IRON BINDING TEST: CPT

## 2021-03-26 PROCEDURE — 85025 COMPLETE CBC W/AUTO DIFF WBC: CPT

## 2021-03-26 PROCEDURE — 36415 COLL VENOUS BLD VENIPUNCTURE: CPT

## 2021-04-14 ENCOUNTER — OFFICE VISIT (OUTPATIENT)
Dept: ONCOLOGY | Age: 76
End: 2021-04-14
Payer: MEDICARE

## 2021-04-14 VITALS
HEART RATE: 65 BPM | DIASTOLIC BLOOD PRESSURE: 78 MMHG | BODY MASS INDEX: 31.28 KG/M2 | SYSTOLIC BLOOD PRESSURE: 150 MMHG | TEMPERATURE: 97.7 F | WEIGHT: 188 LBS | RESPIRATION RATE: 18 BRPM | OXYGEN SATURATION: 100 %

## 2021-04-14 DIAGNOSIS — D50.8 IRON DEFICIENCY ANEMIA SECONDARY TO INADEQUATE DIETARY IRON INTAKE: Primary | ICD-10-CM

## 2021-04-14 DIAGNOSIS — D75.839 THROMBOCYTOSIS: ICD-10-CM

## 2021-04-14 PROCEDURE — G8417 CALC BMI ABV UP PARAM F/U: HCPCS | Performed by: NURSE PRACTITIONER

## 2021-04-14 PROCEDURE — 3017F COLORECTAL CA SCREEN DOC REV: CPT | Performed by: NURSE PRACTITIONER

## 2021-04-14 PROCEDURE — G0463 HOSPITAL OUTPT CLINIC VISIT: HCPCS | Performed by: NURSE PRACTITIONER

## 2021-04-14 PROCEDURE — 1101F PT FALLS ASSESS-DOCD LE1/YR: CPT | Performed by: NURSE PRACTITIONER

## 2021-04-14 PROCEDURE — 1090F PRES/ABSN URINE INCON ASSESS: CPT | Performed by: NURSE PRACTITIONER

## 2021-04-14 PROCEDURE — G8399 PT W/DXA RESULTS DOCUMENT: HCPCS | Performed by: NURSE PRACTITIONER

## 2021-04-14 PROCEDURE — G8536 NO DOC ELDER MAL SCRN: HCPCS | Performed by: NURSE PRACTITIONER

## 2021-04-14 PROCEDURE — G8427 DOCREV CUR MEDS BY ELIG CLIN: HCPCS | Performed by: NURSE PRACTITIONER

## 2021-04-14 PROCEDURE — 99213 OFFICE O/P EST LOW 20 MIN: CPT | Performed by: NURSE PRACTITIONER

## 2021-04-14 PROCEDURE — G9899 SCRN MAM PERF RSLTS DOC: HCPCS | Performed by: NURSE PRACTITIONER

## 2021-04-14 PROCEDURE — G8510 SCR DEP NEG, NO PLAN REQD: HCPCS | Performed by: NURSE PRACTITIONER

## 2021-04-14 NOTE — PATIENT INSTRUCTIONS
Anemia: Care Instructions Your Care Instructions Anemia is a low level of red blood cells, which carry oxygen throughout your body. Many things can cause anemia. Lack of iron is one of the most common causes. Your body needs iron to make hemoglobin, a substance in red blood cells that carries oxygen from the lungs to your body's cells. Without enough iron, the body produces fewer and smaller red blood cells. As a result, your body's cells do not get enough oxygen, and you feel tired and weak. And you may have trouble concentrating. Bleeding is the most common cause of a lack of iron. You may have heavy menstrual bleeding or bleeding caused by conditions such as ulcers, hemorrhoids, or cancer. Regular use of aspirin or other anti-inflammatory medicines (such as ibuprofen) also can cause bleeding in some people. A lack of iron in your diet also can cause anemia, especially at times when the body needs more iron, such as during pregnancy, infancy, and the teen years. Your doctor may have prescribed iron pills. It may take several months of treatment for your iron levels to return to normal. Your doctor also may suggest that you eat foods that are rich in iron, such as meat and beans. There are many other causes of anemia. It is not always due to a lack of iron. Finding the specific cause of your anemia will help your doctor find the right treatment for you. Follow-up care is a key part of your treatment and safety. Be sure to make and go to all appointments, and call your doctor if you are having problems. It's also a good idea to know your test results and keep a list of the medicines you take. How can you care for yourself at home? · Take your medicines exactly as prescribed. Call your doctor if you think you are having a problem with your medicine. · If your doctor recommends iron pills, take them as directed: ? Try to take the pills on an empty stomach about 1 hour before or 2 hours after meals. But you may need to take iron with food to avoid an upset stomach. ? Do not take antacids or drink milk or caffeine drinks (such as coffee, tea, or cola) at the same time or within 2 hours of the time that you take your iron. They can make it hard for your body to absorb the iron. ? Vitamin C (from food or supplements) helps your body absorb iron. Try taking iron pills with a glass of orange juice or some other food that is high in vitamin C, such as citrus fruits. ? Iron pills may cause stomach problems, such as heartburn, nausea, diarrhea, constipation, and cramps. Be sure to drink plenty of fluids, and include fruits, vegetables, and fiber in your diet each day. Iron pills often make your bowel movements dark or green. ? If you forget to take an iron pill, do not take a double dose of iron the next time you take a pill. ? Keep iron pills out of the reach of small children. An overdose of iron can be very dangerous. · Follow your doctor's advice about eating iron-rich foods. These include red meat, shellfish, poultry, eggs, beans, raisins, whole-grain bread, and leafy green vegetables. · Steam vegetables to help them keep their iron content. When should you call for help? Call 911 anytime you think you may need emergency care. For example, call if: 
  · You have symptoms of a heart attack. These may include: 
? Chest pain or pressure, or a strange feeling in the chest. 
? Sweating. ? Shortness of breath. ? Nausea or vomiting. ? Pain, pressure, or a strange feeling in the back, neck, jaw, or upper belly or in one or both shoulders or arms. ? Lightheadedness or sudden weakness. ? A fast or irregular heartbeat. After you call 911, the  may tell you to chew 1 adult-strength or 2 to 4 low-dose aspirin. Wait for an ambulance. Do not try to drive yourself.  
  · You passed out (lost consciousness).   
Call your doctor now or seek immediate medical care if: 
  · You have new or increased shortness of breath.  
  · You are dizzy or lightheaded, or you feel like you may faint.  
  · Your fatigue and weakness continue or get worse.  
  · You have any abnormal bleeding, such as: 
? Nosebleeds. ? Vaginal bleeding that is different (heavier, more frequent, at a different time of the month) than what you are used to. 
? Bloody or black stools, or rectal bleeding. ? Bloody or pink urine. Watch closely for changes in your health, and be sure to contact your doctor if: 
  · You do not get better as expected. Where can you learn more? Go to http://www.hughes.com/ Enter R301 in the search box to learn more about \"Anemia: Care Instructions. \" Current as of: September 23, 2020               Content Version: 12.8 © 8295-9808 [employer]. Care instructions adapted under license by [employer] (which disclaims liability or warranty for this information). If you have questions about a medical condition or this instruction, always ask your healthcare professional. [place] [address] any warranty or liability for your use of this information. Learning About High-Iron Foods What foods are high in iron? The foods you eat contain nutrients, such as vitamins and minerals. Iron is a nutrient. Your body needs the right amount to stay healthy and work as it should. You can use the list below to help you make choices about which foods to eat. Here are some foods that contain iron. They have 1 to 2 milligrams of iron per serving. Fruits · Figs (dried), 5 figs Vegetables · Asparagus (canned), 6 welch · Barb, beet, Swiss chard, or turnip greens, 1 cup · Dried peas, cooked, ½ cup · Seaweed, spirulina (dried), ¼ cup · Spinach, (cooked) ½ cup or (raw) 1 cup Grains · Cereals, fortified with iron, 1 cup · Grits (instant, cooked), fortified with iron, ½ cup Meats and other protein foods · Beans (kidney, lima, navy, white), canned or cooked, ½ cup 
· Beef or lamb, 3 oz · Chicken giblets, 3 oz · Chickpeas (garbanzo beans), ½ cup · Liver of beef, lamb, or pork, 3 oz · Oysters (cooked), 3 oz · Sardines (canned), 3 oz · Soybeans (boiled), ½ cup · Tofu (firm), ½ cup Work with your doctor to find out how much of this nutrient you need. Depending on your health, you may need more or less of it in your diet. Current as of: December 17, 2020               Content Version: 12.8 © 2006-2021 [employer]. Care instructions adapted under license by [employer] (which disclaims liability or warranty for this information). If you have questions about a medical condition or this instruction, always ask your healthcare professional. [address] any warranty or liability for your use of this information. Iron-Rich Diet: Care Instructions Your Care Instructions Your body needs iron to make hemoglobin. Hemoglobin is a substance in red blood cells that carries oxygen from the lungs to cells all through your body. If you do not get enough iron, your body makes fewer and smaller red blood cells. As a result, your body's cells may not get enough oxygen. Adult men need 8 milligrams of iron a day; adult women need 18 milligrams of iron a day. After menopause, women need 8 milligrams of iron a day. A pregnant woman needs 27 milligrams of iron a day. Infants and young children have higher iron needs relative to their size than other age groups. People who have lost blood because of ulcers or heavy menstrual periods may become very low in iron and may develop anemia. Most people can get the iron their bodies need by eating enough of certain iron-rich foods. Your doctor may recommend that you take an iron supplement along with eating an iron-rich diet. Follow-up care is a key part of your treatment and safety. Be sure to make and go to all appointments, and call your doctor if you are having problems.  It's also a good idea to know your test results and keep a list of the medicines you take. How can you care for yourself at home? · Make iron-rich foods a part of your daily diet. Iron-rich foods include: ? All meats, such as chicken, beef, lamb, pork, fish, and shellfish. Liver is especially high in iron. ? Leafy green vegetables. ? Raisins, peas, beans, lentils, barley, and eggs. ? Iron-fortified breakfast cereals. · Eat foods with vitamin C along with iron-rich foods. Vitamin C helps you absorb more iron from food. Drink a glass of orange juice or another citrus juice with your food. · Eat meat and vegetables or grains together. The iron in meat helps your body absorb the iron in other foods. Where can you learn more? Go to http://www.gray.com/ Enter [id number] [id number] in the search box to learn more about \"Iron-Rich Diet: Care Instructions. \" Current as of: December 17, 2020               Content Version: 12.8 © 2006-2021 [employer]. Care instructions adapted under license by [employer] (which disclaims liability or warranty for this information). If you have questions about a medical condition or this instruction, always ask your healthcare professional. [address] any warranty or liability for your use of this information.

## 2021-04-14 NOTE — PROGRESS NOTES
Hematology/Oncology  Progress Note    Name: Jono Cano  : [id number]    PCP: Haydee Acosta MD     Ms. Gerhard Lovelace is a 70 y.o.  female who was seen for management of her reactive thrombocytosis and iron deficiency anemia. Current therapy: Oral iron supplement once daily. Subjective:     Mrs. Gerhard Lovelace is a 56-year-old woman who was seen for management of her reactive thrombocytosis and iron deficiency anemia. She was diagnosed in 2017. She continues to take the oral iron supplement once daily. She denies weakness, fatigue, and dizziness. She denies chest pain or any discomfort. She has no complaints of constipation or abdominal cramps. She denies having any blood in her stool or urine. She has no other complaints or concerns to report    Past medical history, family history, and social history: these were reviewed and remains unchanged.     Past Medical History:   Diagnosis Date    Anemia     Arthritis     Diabetes ([address]     Type 2    H/O seasonal allergies     High cholesterol     Hypertension      Past Surgical History:   Procedure Laterality Date    COLONOSCOPY N/A 2017    COL[facility] ENDOSCOPY    HX CYST INCISION AND DRAINAGE Right     \"approx 30 years ago\"    HX HYSTERECTOMY      \"approx 30 years ago\"     Social History     Socioeconomic History    Marital status:      Spouse name: Not on file    Number of children: Not on file    Years of education: Not on file    Highest education level: Not on file   Occupational History    Not on file   Social Needs    Financial resource strain: Not on file    Food insecurity     Worry: Not on file     Inability: Not on file   [employer] needs     Medical: Not on file     Non-medical: Not on file   Tobacco Use    Smoking status: Never Smoker    Smokeless tobacco: Never Used   Substance and Sexual Activity    Alcohol use: No    Drug use: No    Sexual activity: Not on file   Lifestyle    Physical activity     Days per week: Not on file     Minutes per session: Not on file    Stress: Not on file   Relationships    Social connections     Talks on phone: Not on file     Gets together: Not on file     Attends Latter-day service: Not on file     Active member of club or organization: Not on file     Attends meetings of clubs or organizations: Not on file     Relationship status: Not on file    Intimate partner violence     Fear of current or ex partner: Not on file     Emotionally abused: Not on file     Physically abused: Not on file     Forced sexual activity: Not on file   Other Topics Concern    Not on file   Social History Narrative    Not on file     Family History   Problem Relation Age of Onset    Diabetes Mother     Heart Disease Mother     Kidney Disease Mother     Cancer Father         h/o prostate cancer    Hypertension Father     Arthritis-osteo Father     Diabetes Sister     No Known Problems Brother     Diabetes Sister      Current Outpatient Medications   Medication Sig Dispense Refill    losartan (COZAAR) 50 mg tablet Take 50 mg by mouth daily.  atorvastatin (LIPITOR) 40 mg tablet Take 40 mg by mouth nightly.  ferrous sulfate 325 mg (65 mg iron) tablet Take  by mouth Daily (before breakfast).  fexofenadine (ALLEGRA) 180 mg tablet Take  by mouth daily as needed for Allergies.  meloxicam (MOBIC) 15 mg tablet Take 15 mg by mouth as needed for Pain.  iron polysaccharides (FERREX 150) 150 mg iron capsule Take 1 Cap by mouth two (2) times a day. 30 Cap 6    metFORMIN (GLUCOPHAGE) 500 mg tablet Take  by mouth two (2) times daily (with meals).  NIFEdipine ER (ADALAT CC) 60 mg ER tablet Take 60 mg by mouth daily. Review of Systems   Constitutional: Negative for chills, diaphoresis, fever, malaise/fatigue and weight loss. Respiratory: Negative for cough, hemoptysis, shortness of breath and wheezing.     Cardiovascular: Negative for chest pain, palpitations and leg swelling. Gastrointestinal: Negative for abdominal pain, diarrhea, heartburn, nausea and vomiting. Genitourinary: Negative for dysuria, frequency, hematuria and urgency. Musculoskeletal: Negative for joint pain and myalgias. Skin: Negative for itching and rash. Neurological: Negative for dizziness, seizures, weakness and headaches. Psychiatric/Behavioral: Negative for depression. The patient does not have insomnia. Objective:     Visit Vitals  BP (!) 150/78 (BP Patient Position: Sitting)   Pulse 65   Temp 97.7 °F (36.5 °C) (Skin)   Resp 18   Wt 85.3 kg (188 lb)   SpO2 100%   BMI 31.28 kg/m²       ECOG Performance Status (grade): 0  0 - able to carry on all pre-disease activity w/out restriction  1 - restricted but able to carry out light work  2 - ambulatory and can self- care but unable to carry out work  3 - bed or chair >50% of waking hours  4 - completely disable, total care, confined to bed or chair    Physical Exam  Constitutional:       Appearance: Normal appearance. HENT:      Head: Normocephalic and atraumatic. Eyes:      Pupils: Pupils are equal, round, and reactive to light. Neck:      Musculoskeletal: Neck supple. Cardiovascular:      Rate and Rhythm: Normal rate and regular rhythm. Heart sounds: Normal heart sounds. Pulmonary:      Effort: Pulmonary effort is normal.      Breath sounds: Normal breath sounds. Abdominal:      General: Bowel sounds are normal.      Palpations: Abdomen is soft. Tenderness: There is no abdominal tenderness. There is no guarding. Musculoskeletal: Normal range of motion. Right lower leg: No edema. Left lower leg: No edema. Skin:     General: Skin is warm. Neurological:      General: No focal deficit present. Mental Status: She is alert and oriented to person, place, and time. Mental status is at baseline.           Diagnostics:      No results found for this or any previous visit (from the past 96 hour(s)). Imaging:  No results found for this or any previous visit. Results for orders placed during the hospital encounter of 09/16/11   XR CLAVICLE RIGHT    Narrative Ordering MD: Kali Medina PA-C  Signed By: Odalys Rayo MD  ** FINAL **  ---------------------------------------------------------------------  Procedure Date:  09/16/2011   Accession Number:  [id number]           Order No:   [id number]         Procedure:   RDV - CLAVICLE RT        CPT Code:   54565      Admit Diag:   RIGHT SHOULDER AND COLLAR              Reason:   PAIN  INTERPRETATION:  INDICATION:  Pain  COMPARISON: None. FINDINGS: Two views of the right clavicle demonstrate no fracture. The joint relationships are normal. No lytic or blastic lesions   demonstrated. Soft tissues are unremarkable. The demonstrated right   lung is clear. IMPRESSION: Normal right clavicle. No results found for this or any previous visit. Assessment:     1. Iron deficiency anemia secondary to inadequate dietary iron intake    2. Thrombocytosis ([address]      Plan:   Reactive thrombocytosis:   --  We will continue to monitor CBC at 6-month intervals. -- CBC reviewed with patient today. PLT is stable at 384    Iron deficiency anemia:   -- She was diagnosed in July of 2017. She continues to take the oral iron supplement once daily. -- She reported her last colonoscopy done in 2 years ago, next colonoscopy will be in  3 years per patient. -- We will monitor CBC, CMP, Iron profile, ferritin. -- We will see the patient back in clinic in about 6 months. Always sooner if required. The patient can have lab done prior our next clinic visit. No orders of the defined types were placed in this encounter. Ms. Gerhard Lovelace has a reminder for a \"due or due soon\" health maintenance. I have asked that she contact her primary care provider for follow-up on this health maintenance.    All of patient's questions answered to their apparent satisfaction. They verbally show understanding and agreement with aforementioned plan. Mariola Srinivasan NP          About 25 minutes were spent for this encounter with more than 50% of the time spent in face-to-face counseling, discussing on diagnosis and management plan going forward, and co-ordination of care. Parts of this document has been produced using Dragon dictation system. Unrecognized errors in transcription may be present. Please do not hesitate to reach out for any questions or clarifications.       CC: Brii Wilcox MD

## 2021-05-21 ENCOUNTER — HOSPITAL ENCOUNTER (OUTPATIENT)
Dept: MAMMOGRAPHY | Age: 76
Discharge: HOME | End: 2021-05-21
Attending: FAMILY MEDICINE
Payer: MEDICARE

## 2021-05-21 DIAGNOSIS — Z12.31 VISIT FOR SCREENING MAMMOGRAM: ICD-10-CM

## 2021-05-21 PROCEDURE — 77063 BREAST TOMOSYNTHESIS BI: CPT

## 2021-10-01 ENCOUNTER — LAB ONLY (OUTPATIENT)
Dept: ONCOLOGY | Age: 76
End: 2021-10-01

## 2021-10-01 ENCOUNTER — HOSPITAL ENCOUNTER (OUTPATIENT)
Dept: LAB | Age: 76
Discharge: HOME | End: 2021-10-01
Payer: MEDICARE

## 2021-10-01 DIAGNOSIS — D50.8 IRON DEFICIENCY ANEMIA SECONDARY TO INADEQUATE DIETARY IRON INTAKE: ICD-10-CM

## 2021-10-01 DIAGNOSIS — D50.8 IRON DEFICIENCY ANEMIA SECONDARY TO INADEQUATE DIETARY IRON INTAKE: Primary | ICD-10-CM

## 2021-10-01 DIAGNOSIS — D75.839 THROMBOCYTOSIS: ICD-10-CM

## 2021-10-01 LAB
ALBUMIN SERPL-MCNC: 3.8 G/DL (ref 3.4–5)
ALBUMIN/GLOB SERPL: 1.2 {RATIO} (ref 0.8–1.7)
ALP SERPL-CCNC: 72 U/L (ref 45–117)
ALT SERPL-CCNC: 27 U/L (ref 13–56)
ANION GAP SERPL CALC-SCNC: 5 MMOL/L (ref 3–18)
AST SERPL-CCNC: 22 U/L (ref 10–38)
BASOPHILS # BLD: 0.1 K/UL (ref 0–0.1)
BASOPHILS NFR BLD: 1 % (ref 0–2)
BILIRUB SERPL-MCNC: 0.5 MG/DL (ref 0.2–1)
BUN SERPL-MCNC: 16 MG/DL (ref 7–18)
BUN/CREAT SERPL: 14 (ref 12–20)
CALCIUM SERPL-MCNC: 9.6 MG/DL (ref 8.5–10.1)
CHLORIDE SERPL-SCNC: 106 MMOL/L (ref 100–111)
CO2 SERPL-SCNC: 30 MMOL/L (ref 21–32)
CREAT SERPL-MCNC: 1.15 MG/DL (ref 0.6–1.3)
DIFFERENTIAL METHOD BLD: ABNORMAL
EOSINOPHIL # BLD: 0.4 K/UL (ref 0–0.4)
EOSINOPHIL NFR BLD: 6 % (ref 0–5)
ERYTHROCYTE [DISTWIDTH] IN BLOOD BY AUTOMATED COUNT: 13.7 % (ref 11.6–14.5)
FERRITIN SERPL-MCNC: 336 NG/ML (ref 8–388)
GLOBULIN SER CALC-MCNC: 3.1 G/DL (ref 2–4)
GLUCOSE SERPL-MCNC: 112 MG/DL (ref 74–99)
HCT VFR BLD AUTO: 37.7 % (ref 35–45)
HGB BLD-MCNC: 11.5 G/DL (ref 12–16)
IRON SATN MFR SERPL: 28 % (ref 20–50)
IRON SERPL-MCNC: 78 UG/DL (ref 50–175)
LYMPHOCYTES # BLD: 2.7 K/UL (ref 0.9–3.6)
LYMPHOCYTES NFR BLD: 41 % (ref 21–52)
MCH RBC QN AUTO: 25.8 PG (ref 24–34)
MCHC RBC AUTO-ENTMCNC: 30.5 G/DL (ref 31–37)
MCV RBC AUTO: 84.7 FL (ref 78–100)
MONOCYTES # BLD: 0.6 K/UL (ref 0.05–1.2)
MONOCYTES NFR BLD: 8 % (ref 3–10)
NEUTS SEG # BLD: 2.9 K/UL (ref 1.8–8)
NEUTS SEG NFR BLD: 43 % (ref 40–73)
PLATELET # BLD AUTO: 372 K/UL (ref 135–420)
PMV BLD AUTO: 9.3 FL (ref 9.2–11.8)
POTASSIUM SERPL-SCNC: 4.4 MMOL/L (ref 3.5–5.5)
PROT SERPL-MCNC: 6.9 G/DL (ref 6.4–8.2)
RBC # BLD AUTO: 4.45 M/UL (ref 4.2–5.3)
SODIUM SERPL-SCNC: 141 MMOL/L (ref 136–145)
TIBC SERPL-MCNC: 280 UG/DL (ref 250–450)
WBC # BLD AUTO: 6.6 K/UL (ref 4.6–13.2)

## 2021-10-01 PROCEDURE — 85025 COMPLETE CBC W/AUTO DIFF WBC: CPT

## 2021-10-01 PROCEDURE — 83540 ASSAY OF IRON: CPT

## 2021-10-01 PROCEDURE — 36415 COLL VENOUS BLD VENIPUNCTURE: CPT

## 2021-10-01 PROCEDURE — 82728 ASSAY OF FERRITIN: CPT

## 2021-10-01 PROCEDURE — 80053 COMPREHEN METABOLIC PANEL: CPT

## 2021-10-15 ENCOUNTER — VIRTUAL VISIT (OUTPATIENT)
Dept: ONCOLOGY | Age: 76
End: 2021-10-15
Payer: MEDICARE

## 2021-10-15 DIAGNOSIS — D50.8 IRON DEFICIENCY ANEMIA SECONDARY TO INADEQUATE DIETARY IRON INTAKE: Primary | ICD-10-CM

## 2021-10-15 DIAGNOSIS — D75.839 THROMBOCYTOSIS: ICD-10-CM

## 2021-10-15 PROCEDURE — 99442 PR PHYS/QHP TELEPHONE EVALUATION 11-20 MIN: CPT | Performed by: INTERNAL MEDICINE

## 2021-10-15 NOTE — PROGRESS NOTES
Lance Zamarripa is a 78 y.o. female, evaluated via audio-only technology on 10/15/2021 for Follow-up  . Assessment & Plan:   Reactive thrombocytosis:   --  We will continue to monitor CBC at 6-month intervals. -- CBC reviewed with patient today. PLT is stable at 372, WNL.     Iron deficiency anemia:   -- She was diagnosed in July of 2017. She continues to take the oral iron supplement once daily. -- She reported her last colonoscopy was 2 years ago, next colonoscopy will be in  3 years per patient. She also reported that she did have a cologuard done in 2021 and that was negative    --  CBC, CMP, Iron profile, ferritin reviewed with patient today . -- Continue lab check CBC, CMP, Iron profile, ferritin, ret count.     -- We will see the patient back in clinic in about 6 months. Always sooner if required. The patient can have lab done prior our next clinic visit. 12  Subjective:   Mrs. David Wren is a 81-year-old woman who was seen for management of her reactive thrombocytosis and iron deficiency anemia. She was diagnosed in July of 2017. She continues to take the oral iron supplement once daily. She denies weakness, fatigue, and dizziness. She denies chest pain or any discomfort. She has no complaints of constipation or abdominal cramps. She denies having any blood in her stool or urine. She has no other complaints or concerns to report    Prior to Admission medications    Medication Sig Start Date End Date Taking? Authorizing Provider   iron polysaccharides (FERREX 150) 150 mg iron capsule Take 1 Cap by mouth two (2) times a day. 5/19/19   Tomasa Lemons MD   metFORMIN (GLUCOPHAGE) 500 mg tablet Take  by mouth two (2) times daily (with meals). Provider, Historical   losartan (COZAAR) 50 mg tablet Take 50 mg by mouth daily. Provider, Historical   atorvastatin (LIPITOR) 40 mg tablet Take 40 mg by mouth nightly.     Provider, Historical   NIFEdipine ER (ADALAT CC) 60 mg ER tablet Take 60 mg by mouth daily. Provider, Historical   ferrous sulfate 325 mg (65 mg iron) tablet Take  by mouth Daily (before breakfast). Provider, Historical   fexofenadine (ALLEGRA) 180 mg tablet Take  by mouth daily as needed for Allergies. Provider, Historical   meloxicam (MOBIC) 15 mg tablet Take 15 mg by mouth as needed for Pain. Provider, Historical         Review of Systems   Constitutional: Negative. Negative for chills, diaphoresis, fever, malaise/fatigue and weight loss. HENT: Negative. Respiratory: Negative. Negative for cough, hemoptysis, shortness of breath and wheezing. Cardiovascular: Negative. Negative for chest pain, palpitations and leg swelling. Gastrointestinal: Negative. Negative for abdominal pain, diarrhea, heartburn, nausea and vomiting. Genitourinary: Negative. Negative for dysuria, frequency, hematuria and urgency. Musculoskeletal: Negative. Negative for joint pain and myalgias. Skin: Negative. Negative for itching and rash. Neurological: Negative. Negative for dizziness, seizures, weakness and headaches. Endo/Heme/Allergies: Negative. Psychiatric/Behavioral: Negative. Negative for depression. The patient does not have insomnia. Patient-Reported Vitals 10/15/2021   Patient-Reported Weight 188lb   Patient-Reported Systolic  324   Patient-Reported Diastolic 74       Mansoor Carter, who was evaluated through a patient-initiated, synchronous (real-time) audio only encounter, and/or her healthcare decision maker, is aware that it is a billable service, with coverage as determined by her insurance carrier. She provided verbal consent to proceed: YES -Consent obtained within past 12 months Yes. . She has not had a related appointment within my department in the past 7 days or scheduled within the next 24 hours.     Time Documentation 20 min     Ness Barnard NP     I have assessed the patient independently and agreed with the full assessment as outlined. Bhavin WALDEN Do, M.D.

## 2022-03-19 PROBLEM — D50.8 IRON DEFICIENCY ANEMIA SECONDARY TO INADEQUATE DIETARY IRON INTAKE: Status: ACTIVE | Noted: 2017-07-07

## 2022-03-19 PROBLEM — D75.839 THROMBOCYTOSIS: Status: ACTIVE | Noted: 2017-07-07

## 2022-04-12 ENCOUNTER — APPOINTMENT (OUTPATIENT)
Dept: ONCOLOGY | Age: 77
End: 2022-04-12

## 2022-04-12 ENCOUNTER — HOSPITAL ENCOUNTER (OUTPATIENT)
Dept: LAB | Age: 77
Discharge: HOME | End: 2022-04-12
Payer: MEDICARE

## 2022-04-12 DIAGNOSIS — D75.839 THROMBOCYTOSIS: ICD-10-CM

## 2022-04-12 DIAGNOSIS — D50.8 IRON DEFICIENCY ANEMIA SECONDARY TO INADEQUATE DIETARY IRON INTAKE: ICD-10-CM

## 2022-04-12 LAB
ALBUMIN SERPL-MCNC: 3.9 G/DL (ref 3.4–5)
ALBUMIN/GLOB SERPL: 1.2 {RATIO} (ref 0.8–1.7)
ALP SERPL-CCNC: 84 U/L (ref 45–117)
ALT SERPL-CCNC: 27 U/L (ref 13–56)
ANION GAP SERPL CALC-SCNC: 4 MMOL/L (ref 3–18)
AST SERPL-CCNC: 19 U/L (ref 10–38)
BASOPHILS # BLD: 0.1 K/UL (ref 0–0.1)
BASOPHILS NFR BLD: 1 % (ref 0–2)
BILIRUB SERPL-MCNC: 0.3 MG/DL (ref 0.2–1)
BUN SERPL-MCNC: 17 MG/DL (ref 7–18)
BUN/CREAT SERPL: 14 (ref 12–20)
CALCIUM SERPL-MCNC: 9.5 MG/DL (ref 8.5–10.1)
CHLORIDE SERPL-SCNC: 107 MMOL/L (ref 100–111)
CO2 SERPL-SCNC: 30 MMOL/L (ref 21–32)
CREAT SERPL-MCNC: 1.18 MG/DL (ref 0.6–1.3)
DIFFERENTIAL METHOD BLD: ABNORMAL
EOSINOPHIL # BLD: 0.4 K/UL (ref 0–0.4)
EOSINOPHIL NFR BLD: 9 % (ref 0–5)
ERYTHROCYTE [DISTWIDTH] IN BLOOD BY AUTOMATED COUNT: 13.6 % (ref 11.6–14.5)
FERRITIN SERPL-MCNC: 358 NG/ML (ref 8–388)
GLOBULIN SER CALC-MCNC: 3.3 G/DL (ref 2–4)
GLUCOSE SERPL-MCNC: 134 MG/DL (ref 74–99)
HCT VFR BLD AUTO: 37.6 % (ref 35–45)
HGB BLD-MCNC: 11.2 G/DL (ref 12–16)
IMM GRANULOCYTES # BLD AUTO: 0 K/UL (ref 0–0.04)
IMM GRANULOCYTES NFR BLD AUTO: 0 % (ref 0–0.5)
IRON SATN MFR SERPL: 37 % (ref 20–50)
IRON SERPL-MCNC: 101 UG/DL (ref 50–175)
LYMPHOCYTES # BLD: 2 K/UL (ref 0.9–3.6)
LYMPHOCYTES NFR BLD: 40 % (ref 21–52)
MCH RBC QN AUTO: 25.5 PG (ref 24–34)
MCHC RBC AUTO-ENTMCNC: 29.8 G/DL (ref 31–37)
MCV RBC AUTO: 85.6 FL (ref 78–100)
MONOCYTES # BLD: 0.5 K/UL (ref 0.05–1.2)
MONOCYTES NFR BLD: 10 % (ref 3–10)
NEUTS SEG # BLD: 2 K/UL (ref 1.8–8)
NEUTS SEG NFR BLD: 40 % (ref 40–73)
NRBC # BLD: 0 K/UL (ref 0–0.01)
NRBC BLD-RTO: 0 PER 100 WBC
PLATELET # BLD AUTO: 358 K/UL (ref 135–420)
PMV BLD AUTO: 9.4 FL (ref 9.2–11.8)
POTASSIUM SERPL-SCNC: 4.1 MMOL/L (ref 3.5–5.5)
PROT SERPL-MCNC: 7.2 G/DL (ref 6.4–8.2)
RBC # BLD AUTO: 4.39 M/UL (ref 4.2–5.3)
SODIUM SERPL-SCNC: 141 MMOL/L (ref 136–145)
TIBC SERPL-MCNC: 276 UG/DL (ref 250–450)
WBC # BLD AUTO: 4.9 K/UL (ref 4.6–13.2)

## 2022-04-12 PROCEDURE — 80053 COMPREHEN METABOLIC PANEL: CPT

## 2022-04-12 PROCEDURE — 85025 COMPLETE CBC W/AUTO DIFF WBC: CPT

## 2022-04-12 PROCEDURE — 82728 ASSAY OF FERRITIN: CPT

## 2022-04-12 PROCEDURE — 36415 COLL VENOUS BLD VENIPUNCTURE: CPT

## 2022-04-12 PROCEDURE — 83540 ASSAY OF IRON: CPT

## 2022-04-18 ENCOUNTER — VIRTUAL VISIT (OUTPATIENT)
Dept: ONCOLOGY | Age: 77
End: 2022-04-18
Payer: MEDICARE

## 2022-04-18 DIAGNOSIS — D50.8 IRON DEFICIENCY ANEMIA SECONDARY TO INADEQUATE DIETARY IRON INTAKE: Primary | ICD-10-CM

## 2022-04-18 DIAGNOSIS — D75.839 THROMBOCYTOSIS: ICD-10-CM

## 2022-04-18 PROCEDURE — 99442 PR PHYS/QHP TELEPHONE EVALUATION 11-20 MIN: CPT | Performed by: INTERNAL MEDICINE

## 2022-04-18 NOTE — PROGRESS NOTES
Serenity  is a 78 y.o. female, evaluated via audio-only technology on 4/18/2022 for Follow-up  . Assessment & Plan:   Diagnoses and all orders for this visit:    1. Iron deficiency anemia secondary to inadequate dietary iron intake    2. Thrombocytosis        Iron deficiency anemia:   -- She was diagnosed in July of 2017. She continues to take the oral iron supplement once daily. -- She reported her last colonoscopy was 2 years ago, next colonoscopy will be in 3 years per patient. She also reported that she did have a cologuard done in 2021 and that was negative. --  Recent CBC, CMP, Iron profile, ferritin reviewed with patient today . 4/12/2022 H/H 11.2/37.6, PLTs 358K, Iron sat 37%, Ferritin 358.  -- She will f/u GI as directed. -- Continue lab check CBC, CMP, Iron profile, ferritin, ret count. Reactive thrombocytosis, improved:   -- 4/12/2022 CBC reviewed with patient today. PLT is stable at 358K, WNL.         -- We will see the patient back in clinic in about 6 months. Always sooner if required. The patient can have lab done prior our next clinic visit. 12  Subjective:   Mrs. Collette Mungo is a 53-year-old woman who was seen for management of her reactive thrombocytosis and iron deficiency anemia. She was diagnosed in July of 2017. She continues to take the oral iron supplement once daily. Today she reported doing well. She denies weakness, fatigue, and dizziness. She denies chest pain or any discomfort. She has no complaints of constipation or abdominal cramps. She denies having any blood in her stool or urine. She has no other complaints or concerns to report. Prior to Admission medications    Medication Sig Start Date End Date Taking? Authorizing Provider   iron polysaccharides (FERREX 150) 150 mg iron capsule Take 1 Cap by mouth two (2) times a day. 5/19/19   Carmela Orantes MD   metFORMIN (GLUCOPHAGE) 500 mg tablet Take  by mouth two (2) times daily (with meals). Provider, Historical   losartan (COZAAR) 50 mg tablet Take 50 mg by mouth daily. Provider, Historical   atorvastatin (LIPITOR) 40 mg tablet Take 40 mg by mouth nightly. Provider, Historical   NIFEdipine ER (ADALAT CC) 60 mg ER tablet Take 60 mg by mouth daily. Provider, Historical   ferrous sulfate 325 mg (65 mg iron) tablet Take  by mouth Daily (before breakfast). Provider, Historical   fexofenadine (ALLEGRA) 180 mg tablet Take  by mouth daily as needed for Allergies. Provider, Historical   meloxicam (MOBIC) 15 mg tablet Take 15 mg by mouth as needed for Pain. Provider, Historical     Patient Active Problem List   Diagnosis Code    Thrombocytosis D75.839    Iron deficiency anemia secondary to inadequate dietary iron intake D50.8       Review of Systems   Constitutional: Negative for chills, diaphoresis, fever, malaise/fatigue and weight loss. Respiratory: Negative for cough, hemoptysis, shortness of breath and wheezing. Cardiovascular: Negative for chest pain, palpitations and leg swelling. Gastrointestinal: Negative for abdominal pain, diarrhea, heartburn, nausea and vomiting. Genitourinary: Negative for dysuria, frequency, hematuria and urgency. Musculoskeletal: Negative for joint pain and myalgias. Skin: Negative for itching and rash. Neurological: Negative for dizziness, seizures, weakness and headaches. Psychiatric/Behavioral: Negative for depression. The patient does not have insomnia. Patient-Reported Vitals 10/15/2021   Patient-Reported Weight 188lb   Patient-Reported Systolic  535   Patient-Reported Diastolic 74         The patient was advised that our priority at this time is to keep the patients safe, and therefore we are reaching out to patients virtually to prevent them coming into the office unless necessarily. Patient verbalized understanding and was agreeable for virtual visit.     Jay Helton, who was evaluated through a patient-initiated, synchronous (real-time) audio only encounter, and/or her healthcare decision maker, is aware that it is a billable service, with coverage as determined by her insurance carrier. She provided verbal consent to proceed: Yes. She has not had a related appointment within my department in the past 7 days or scheduled within the next 24 hours. I have spent 19 minutes reviewing previous notes, test results and discussing the diagnosis and importance of compliance with the treatment plan as well as documenting on the day of the visit.     Laura Barillas MD      CC: Karley Shannon MD

## 2022-05-02 ENCOUNTER — TRANSCRIBE ORDER (OUTPATIENT)
Dept: SCHEDULING | Age: 77
End: 2022-05-02

## 2022-05-02 DIAGNOSIS — Z12.31 VISIT FOR SCREENING MAMMOGRAM: Primary | ICD-10-CM

## 2022-06-06 ENCOUNTER — HOSPITAL ENCOUNTER (OUTPATIENT)
Dept: MAMMOGRAPHY | Age: 77
Discharge: HOME | End: 2022-06-06
Attending: FAMILY MEDICINE
Payer: MEDICARE

## 2022-06-06 DIAGNOSIS — Z12.31 VISIT FOR SCREENING MAMMOGRAM: ICD-10-CM

## 2022-06-06 PROCEDURE — 77063 BREAST TOMOSYNTHESIS BI: CPT

## 2022-10-07 ENCOUNTER — HOSPITAL ENCOUNTER (OUTPATIENT)
Dept: LAB | Age: 77
Discharge: HOME | End: 2022-10-07
Payer: MEDICARE

## 2022-10-07 ENCOUNTER — LAB ONLY (OUTPATIENT)
Dept: ONCOLOGY | Age: 77
End: 2022-10-07

## 2022-10-07 DIAGNOSIS — D75.839 THROMBOCYTOSIS: ICD-10-CM

## 2022-10-07 DIAGNOSIS — D50.8 IRON DEFICIENCY ANEMIA SECONDARY TO INADEQUATE DIETARY IRON INTAKE: Primary | ICD-10-CM

## 2022-10-07 LAB
ALBUMIN SERPL-MCNC: 3.9 G/DL (ref 3.4–5)
ALBUMIN/GLOB SERPL: 1.3 {RATIO} (ref 0.8–1.7)
ALP SERPL-CCNC: 80 U/L (ref 45–117)
ALT SERPL-CCNC: 29 U/L (ref 13–56)
ANION GAP SERPL CALC-SCNC: 6 MMOL/L (ref 3–18)
AST SERPL-CCNC: 20 U/L (ref 10–38)
BASOPHILS # BLD: 0.1 K/UL (ref 0–0.1)
BASOPHILS NFR BLD: 1 % (ref 0–2)
BILIRUB SERPL-MCNC: 0.5 MG/DL (ref 0.2–1)
BUN SERPL-MCNC: 20 MG/DL (ref 7–18)
BUN/CREAT SERPL: 17 (ref 12–20)
CALCIUM SERPL-MCNC: 9.9 MG/DL (ref 8.5–10.1)
CHLORIDE SERPL-SCNC: 105 MMOL/L (ref 100–111)
CO2 SERPL-SCNC: 29 MMOL/L (ref 21–32)
CREAT SERPL-MCNC: 1.2 MG/DL (ref 0.6–1.3)
DIFFERENTIAL METHOD BLD: ABNORMAL
EOSINOPHIL # BLD: 0.4 K/UL (ref 0–0.4)
EOSINOPHIL NFR BLD: 7 % (ref 0–5)
ERYTHROCYTE [DISTWIDTH] IN BLOOD BY AUTOMATED COUNT: 13.9 % (ref 11.6–14.5)
FERRITIN SERPL-MCNC: 428 NG/ML (ref 8–388)
GLOBULIN SER CALC-MCNC: 3.1 G/DL (ref 2–4)
GLUCOSE SERPL-MCNC: 154 MG/DL (ref 74–99)
HCT VFR BLD AUTO: 38 % (ref 35–45)
HGB BLD-MCNC: 11.4 G/DL (ref 12–16)
IMM GRANULOCYTES # BLD AUTO: 0 K/UL (ref 0–0.04)
IMM GRANULOCYTES NFR BLD AUTO: 0 % (ref 0–0.5)
IRON SATN MFR SERPL: 36 % (ref 20–50)
IRON SERPL-MCNC: 98 UG/DL (ref 50–175)
LYMPHOCYTES # BLD: 2.4 K/UL (ref 0.9–3.6)
LYMPHOCYTES NFR BLD: 41 % (ref 21–52)
MCH RBC QN AUTO: 25.7 PG (ref 24–34)
MCHC RBC AUTO-ENTMCNC: 30 G/DL (ref 31–37)
MCV RBC AUTO: 85.8 FL (ref 78–100)
MONOCYTES # BLD: 0.5 K/UL (ref 0.05–1.2)
MONOCYTES NFR BLD: 9 % (ref 3–10)
NEUTS SEG # BLD: 2.5 K/UL (ref 1.8–8)
NEUTS SEG NFR BLD: 42 % (ref 40–73)
NRBC # BLD: 0 K/UL (ref 0–0.01)
NRBC BLD-RTO: 0 PER 100 WBC
PLATELET # BLD AUTO: 389 K/UL (ref 135–420)
PMV BLD AUTO: 9.1 FL (ref 9.2–11.8)
POTASSIUM SERPL-SCNC: 4.1 MMOL/L (ref 3.5–5.5)
PROT SERPL-MCNC: 7 G/DL (ref 6.4–8.2)
RBC # BLD AUTO: 4.43 M/UL (ref 4.2–5.3)
RETICS/RBC NFR AUTO: 1.4 % (ref 0.5–2.5)
SODIUM SERPL-SCNC: 140 MMOL/L (ref 136–145)
TIBC SERPL-MCNC: 276 UG/DL (ref 250–450)
WBC # BLD AUTO: 5.9 K/UL (ref 4.6–13.2)

## 2022-10-07 PROCEDURE — 36415 COLL VENOUS BLD VENIPUNCTURE: CPT

## 2022-10-07 PROCEDURE — 85045 AUTOMATED RETICULOCYTE COUNT: CPT

## 2022-10-07 PROCEDURE — 80053 COMPREHEN METABOLIC PANEL: CPT

## 2022-10-07 PROCEDURE — 83540 ASSAY OF IRON: CPT

## 2022-10-07 PROCEDURE — 85025 COMPLETE CBC W/AUTO DIFF WBC: CPT

## 2022-10-07 PROCEDURE — 82728 ASSAY OF FERRITIN: CPT

## 2022-10-21 ENCOUNTER — VIRTUAL VISIT (OUTPATIENT)
Dept: ONCOLOGY | Age: 77
End: 2022-10-21
Payer: MEDICARE

## 2022-10-21 DIAGNOSIS — D50.8 IRON DEFICIENCY ANEMIA SECONDARY TO INADEQUATE DIETARY IRON INTAKE: Primary | ICD-10-CM

## 2022-10-21 DIAGNOSIS — D75.839 THROMBOCYTOSIS: ICD-10-CM

## 2022-10-21 PROCEDURE — 99442 PR PHYS/QHP TELEPHONE EVALUATION 11-20 MIN: CPT | Performed by: NURSE PRACTITIONER

## 2022-12-13 ENCOUNTER — HOSPITAL ENCOUNTER (EMERGENCY)
Age: 77
Discharge: HOME | End: 2022-12-13
Attending: EMERGENCY MEDICINE
Payer: MEDICARE

## 2022-12-13 ENCOUNTER — APPOINTMENT (OUTPATIENT)
Dept: GENERAL RADIOLOGY | Age: 77
End: 2022-12-13
Attending: NURSE PRACTITIONER
Payer: MEDICARE

## 2022-12-13 VITALS
SYSTOLIC BLOOD PRESSURE: 182 MMHG | HEART RATE: 97 BPM | BODY MASS INDEX: 32.15 KG/M2 | HEIGHT: 65 IN | RESPIRATION RATE: 16 BRPM | DIASTOLIC BLOOD PRESSURE: 77 MMHG | WEIGHT: 193 LBS | TEMPERATURE: 98 F | OXYGEN SATURATION: 98 %

## 2022-12-13 DIAGNOSIS — S30.0XXA CONTUSION OF LOWER BACK, INITIAL ENCOUNTER: ICD-10-CM

## 2022-12-13 DIAGNOSIS — S63.296A: Primary | ICD-10-CM
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
## 2022-12-13 DIAGNOSIS — S40.011A CONTUSION OF RIGHT SHOULDER, INITIAL ENCOUNTER: ICD-10-CM

## 2022-12-13 PROCEDURE — 73130 X-RAY EXAM OF HAND: CPT

## 2022-12-13 PROCEDURE — 72100 X-RAY EXAM L-S SPINE 2/3 VWS: CPT

## 2022-12-13 PROCEDURE — 75810000301 HC ER LEVEL 1 CLOSED TREATMNT FRACTURE/DISLOCATION

## 2022-12-13 PROCEDURE — 73030 X-RAY EXAM OF SHOULDER: CPT

## 2022-12-13 PROCEDURE — 99283 EMERGENCY DEPT VISIT LOW MDM: CPT

## 2022-12-13 RX ORDER — LIDOCAINE HYDROCHLORIDE 20 MG/ML
5 INJECTION, SOLUTION INFILTRATION; PERINEURAL ONCE
Status: DISCONTINUED | OUTPATIENT
Start: 2022-12-13 | End: 2022-12-13 | Stop reason: HOSPADM

## 2022-12-13 NOTE — ED PROVIDER NOTES
EMERGENCY DEPARTMENT HISTORY AND PHYSICAL EXAM    Date: 12/13/2022  Patient Name: Vinod Greco    History of Presenting Illness     Chief Complaint   Patient presents with    Fall    Finger Pain         History Provided By: patient      Additional History (Context): Vinod Greco is a 72-year-old female with past medical history significant for anemia, arthritis, diabetes, hypertension, and high cholesterol who presents to the ER ambulatory after a slip and fall. Patient states she missed the last step while going down the stairs slipping and landing on her back. She felt the first step hit her lower back and her right shoulder hit the back of the step as well. She thinks her right hand bent backwards and she is having significant pain in the right pinky with some deformity. She denies any prior injury or trauma to the hand. She denies any paresthesia. Nothing taken for pain prior to arrival.  Patient described as moderate, constant, and throbbing. Denies any head injury or neck pain. No paresthesia or extremity weakness. No precipitating dizziness, diaphoresis, chest pain, shortness of breath. PCP: Mindy Singh MD    Current Facility-Administered Medications   Medication Dose Route Frequency Provider Last Rate Last Admin    lidocaine (XYLOCAINE) 20 mg/mL (2 %) injection 100 mg  5 mL SubCUTAneous ONCE Venancio Lenz, FNP         Current Outpatient Medications   Medication Sig Dispense Refill    iron polysaccharides (FERREX 150) 150 mg iron capsule Take 1 Cap by mouth two (2) times a day. 30 Cap 6    metFORMIN (GLUCOPHAGE) 500 mg tablet Take  by mouth two (2) times daily (with meals). losartan (COZAAR) 50 mg tablet Take 50 mg by mouth daily. atorvastatin (LIPITOR) 40 mg tablet Take 40 mg by mouth nightly. NIFEdipine ER (ADALAT CC) 60 mg ER tablet Take 60 mg by mouth daily.       ferrous sulfate 325 mg (65 mg iron) tablet Take  by mouth Daily (before breakfast). fexofenadine (ALLEGRA) 180 mg tablet Take  by mouth daily as needed for Allergies. meloxicam (MOBIC) 15 mg tablet Take 15 mg by mouth as needed for Pain. Past History     Past Medical History:  Past Medical History:   Diagnosis Date    Anemia     Arthritis     Diabetes ([address]     Type 2    H/O seasonal allergies     High cholesterol     Hypertension        Past Surgical History:  Past Surgical History:   Procedure Laterality Date    COLONOSCOPY N/A 1/12/2017    COLONOSCOPY performed by Ger Bueno MD at [facility] ENDOSCOPY    HX CYST INCISION AND DRAINAGE Right     \"approx 30 years ago\"    HX HYSTERECTOMY      \"approx 30 years ago\"       Family History:  Family History   Problem Relation Age of Onset    Diabetes Mother     Heart Disease Mother     Kidney Disease Mother     Cancer Father         h/o prostate cancer    Hypertension Father     OSTEOARTHRITIS Father     Diabetes Sister     No Known Problems Brother     Diabetes Sister        Social History:  Social History     Tobacco Use    Smoking status: Never    Smokeless tobacco: Never   Substance Use Topics    Alcohol use: No    Drug use: No       Allergies: Allergies   Allergen Reactions    Lisinopril Cough    Tape [Adhesive] Rash     Itching at site         Review of Systems     Review of Systems   Constitutional: Negative for chills and fever. HENT: Negative for nasal congestion, sore throat, rhinorrhea  Eyes: Negative. Respiratory: Negative for cough and for shortness of breath. Cardiovascular: Negative for chest pain and palpitations. Gastrointestinal: Negative for abdominal pain, constipation, diarrhea, nausea and vomiting. Genitourinary: Negative. Negative for difficulty urinating and flank pain. Musculoskeletal: Positive for low back, right shoulder, and right hand pain. Negative for gait problem and neck pain. Skin: Negative. Allergic/Immunologic: Negative.     Neurological: Negative for dizziness, weakness, numbness and headaches. Psychiatric/Behavioral: Negative. All other systems reviewed and are negative. All Other Systems Negative    Physical Exam     Vitals:    12/13/22 1155   BP: (!) 182/77   Pulse: 97   Resp: 16   Temp: 98 °F (36.7 °C)   SpO2: 98%   Weight: 87.5 kg (193 lb)   Height: 5' 5\" (1.651 m)     Physical Exam  Vitals and nursing note reviewed. Constitutional:       General: She is not in acute distress. Appearance: Normal appearance. She is not ill-appearing, toxic-appearing or diaphoretic. HENT:      Head: Normocephalic and atraumatic. Nose: Nose normal.      Mouth/Throat:      Mouth: Mucous membranes are moist.      Pharynx: Oropharynx is clear. Eyes:      General: Lids are normal. Vision grossly intact. Conjunctiva/sclera: Conjunctivae normal.   Cardiovascular:      Rate and Rhythm: Normal rate and regular rhythm. Pulses: Normal pulses. Heart sounds: Normal heart sounds. Pulmonary:      Effort: Pulmonary effort is normal. No respiratory distress. Breath sounds: Normal breath sounds. No stridor. No wheezing, rhonchi or rales. Chest:      Chest wall: No tenderness. Abdominal:      Palpations: Abdomen is soft. Tenderness: There is no abdominal tenderness. There is no right CVA tenderness, left CVA tenderness or guarding. Musculoskeletal:      Right shoulder: Tenderness present. Normal range of motion. Normal pulse. Right elbow: Normal.      Right wrist: Normal.      Right hand: Deformity and tenderness present. Decreased range of motion. Cervical back: Normal, full passive range of motion without pain, normal range of motion and neck supple. No tenderness or bony tenderness. Thoracic back: Normal.      Lumbar back: Tenderness present.  Negative right straight leg raise test and negative left straight leg raise test.      Comments: Unable to flex at the DIP joint on the right hand with significant tenderness and swelling localized. Diffusely tender at the IP joint of the right thumb. No snuffbox tenderness. Normal thumb extension, A-OK sign and cross finger abduction and finger abduction normal and intact. Opposition is normal.  Normal digital sensation along the median, ulnar and radial distributions. Normal capillary refill. Lymphadenopathy:      Cervical: No cervical adenopathy. Skin:     General: Skin is warm and dry. Capillary Refill: Capillary refill takes less than 2 seconds. Neurological:      General: No focal deficit present. Mental Status: She is alert and oriented to person, place, and time. Psychiatric:         Mood and Affect: Mood normal.         Behavior: Behavior normal. Behavior is cooperative. Diagnostic Study Results     Labs -   No results found for this or any previous visit (from the past 12 hour(s)). Radiologic Studies -   XR HAND RT MIN 3 V   Final Result   Posterior dislocation of the PIP joint in the fifth finger. XR SHOULDER RT AP/LAT MIN 2 V   Final Result   No acute injuries      XR SPINE LUMB 2 OR 3 V   Final Result   Chronic degenerative disease with no acute findings as described. CT Results  (Last 48 hours)      None          CXR Results  (Last 48 hours)      None              Medical Decision Making   I am the first provider for this patient. I reviewed the vital signs, available nursing notes, past medical history, past surgical history, family history and social history. Vital Signs-Reviewed the patient's vital signs.         Records Reviewed: Nursing notes, old medical records and any previous labs, imaging, visits, consultations pertinent to patient care    Procedures:  Reduction of Joint    Date/Time: 12/13/2022 2:47 PM  Performed by: KENNEY Ordaz  Authorized by: KENNEY Ordaz     Consent:     Consent obtained:  Verbal    Consent given by:  Patient    Risks discussed:  Nerve damage, pain and vascular damage Alternatives discussed:  No treatment, delayed treatment and alternative treatment  Universal protocol:     Procedure explained and questions answered to patient or proxy's satisfaction: yes      Patient identity confirmed:  Verbally with patient and arm band  Injury:     Injury location:  Finger    Finger injury location:  R little finger    MCP joint involved: no      IP joint involved: no    Pre-procedure details:     Distal neurologic exam:  Normal    Distal perfusion: brisk capillary refill      Range of motion: reduced    Anesthesia:     Anesthesia method:  Nerve block    Block needle gauge:  30 G    Block anesthetic:  Lidocaine 2% w/o epi    Block technique:  R 5th finger, web space digital    Block injection procedure:  Anatomic landmarks identified, introduced needle, incremental injection, negative aspiration for blood and anatomic landmarks palpated    Block outcome:  Anesthesia achieved  Procedure details:     Skeletal traction used: yes      Reduction successful: yes      Immobilization:  Splint    Splint type:  Finger    Supplies used: cage. Attestation: Splint applied and adjusted personally by me    Post-procedure details:     Distal neurologic exam:  Normal    Distal perfusion: brisk capillary refill      Range of motion: normal      Procedure completion:  Tolerated well, no immediate complications    ED Course: Progress Notes, Reevaluation, and Consults:  12:12 PM  Initial assessment performed. The patients presenting problems have been discussed, and they/their family are in agreement with the care plan formulated and outlined with them. I have encouraged them to ask questions as they arise throughout their visit. 3:30 PM pain control was achieved. The radiological findings were discussed in detail with the patient. X-ray of the lumbar spine and shoulder are unremarkable for acute process per my interpretation of images.   There is dislocation of the DIP joint which I discussed with the patient and was successfully reduced. Appropriate for outpatient management. Will discuss splinting, supportive treatment, NSAIDS, RICE and orthopedic follow-up. Discussed treatment plan, return precautions, symptomatic relief, and expected time to improvement. All questions answered. Patient is stable for discharge and outpatient management. Discussed use of over-the-counter Tylenol she is already on meloxicam so no NSAIDs are indicated. The patient was educated extensively on mandatory return criteria as well as splint home care. The patient was instructed both verbally and written to follow-up with PCP within 1 week. The patient verbalized understanding of all instruction provided and agrees with the plan of care. Provider Notes (Medical Decision Making):     Differential diagnosis: Sprain/strain, dislocation, fracture    Patient is a 79-year-old female, well-appearing, ambulatory who presents with complaints of back pain, right shoulder pain, and right hand pain after slip and fall going down the stairs. She was at the last step she missed landing backward onto her buttock, lower back, shoulder, and hand bracing her fall. Due to the mechanism of injury a thorough physical exam was completed and appropriate diagnostic studies were ordered. There is deformity at the right little finger DIP joint concerning for dislocation. We will obtain x-rays to rule out fracture. MED RECONCILIATION:  Current Facility-Administered Medications   Medication Dose Route Frequency    lidocaine (XYLOCAINE) 20 mg/mL (2 %) injection 100 mg  5 mL SubCUTAneous ONCE     Current Outpatient Medications   Medication Sig    iron polysaccharides (FERREX 150) 150 mg iron capsule Take 1 Cap by mouth two (2) times a day. metFORMIN (GLUCOPHAGE) 500 mg tablet Take  by mouth two (2) times daily (with meals). losartan (COZAAR) 50 mg tablet Take 50 mg by mouth daily.     atorvastatin (LIPITOR) 40 mg tablet Take 40 mg by mouth nightly. NIFEdipine ER (ADALAT CC) 60 mg ER tablet Take 60 mg by mouth daily. ferrous sulfate 325 mg (65 mg iron) tablet Take  by mouth Daily (before breakfast). fexofenadine (ALLEGRA) 180 mg tablet Take  by mouth daily as needed for Allergies. meloxicam (MOBIC) 15 mg tablet Take 15 mg by mouth as needed for Pain. Disposition:  Home in stable condition    DISCHARGE NOTE:     Patient has been reexamined. Patient has no new complaints, changes, or physical findings. Care plan outlined and precautions discussed. Discussed proper way to take medications. Discussed treatment plan, return precautions, symptomatic relief, and expected time to improvement. All questions answered. Patient is stable for discharge and outpatient management. Patient is ready to go home. Follow-up Information       Follow up With Specialties Details Why Contact Info    Brooklyn Hoover MD Family Medicine Schedule an appointment as soon as possible for a visit  Follow-up from the Emergency Department [facility] [address] [facility] EMERGENCY DEPT Emergency Medicine  As needed, If symptoms worsen [address] [address] [facility] [address]  [phone]            Discharge Medication List as of 12/13/2022  3:09 PM                Diagnosis     Clinical Impression:   1. Closed traumatic dislocation of distal interphalangeal (DIP) joint of right little finger    2. Accidental fall on or from stairs or steps, initial encounter    3. Contusion of right shoulder, initial encounter    4. Contusion of lower back, initial encounter          Dictation disclaimer:  Please note that this dictation was completed with Peer[employer], the computer voice recognition software. Quite often unanticipated grammatical, syntax, homophones, and other interpretive errors are inadvertently transcribed by the computer software. Please disregard these errors.   Please excuse any errors that have escaped final proofreading.

## 2023-02-04 DIAGNOSIS — D50.8 IRON DEFICIENCY ANEMIA SECONDARY TO INADEQUATE DIETARY IRON INTAKE: Primary | ICD-10-CM

## 2023-02-04 DIAGNOSIS — D75.839 THROMBOCYTOSIS: ICD-10-CM

## 2023-02-05 DIAGNOSIS — D50.8 IRON DEFICIENCY ANEMIA SECONDARY TO INADEQUATE DIETARY IRON INTAKE: Primary | ICD-10-CM

## 2023-02-05 DIAGNOSIS — D75.839 THROMBOCYTOSIS: ICD-10-CM

## 2023-04-19 ENCOUNTER — HOSPITAL ENCOUNTER (OUTPATIENT)
Facility: HOSPITAL | Age: 78
Setting detail: SPECIMEN
Discharge: HOME | End: 2023-04-22
Payer: MEDICARE

## 2023-04-19 DIAGNOSIS — D75.839 THROMBOCYTOSIS, UNSPECIFIED: ICD-10-CM

## 2023-04-19 DIAGNOSIS — D50.8 OTHER IRON DEFICIENCY ANEMIAS: ICD-10-CM

## 2023-04-19 LAB
ALBUMIN SERPL-MCNC: 4.1 G/DL (ref 3.4–5)
ALBUMIN/GLOB SERPL: 1.4 (ref 0.8–1.7)
ALP SERPL-CCNC: 82 U/L (ref 45–117)
ALT SERPL-CCNC: 28 U/L (ref 13–56)
ANION GAP SERPL CALC-SCNC: 4 MMOL/L (ref 3–18)
AST SERPL-CCNC: 21 U/L (ref 10–38)
BASOPHILS # BLD: 0.1 K/UL (ref 0–0.1)
BASOPHILS NFR BLD: 1 % (ref 0–2)
BILIRUB SERPL-MCNC: 0.3 MG/DL (ref 0.2–1)
BUN SERPL-MCNC: 20 MG/DL (ref 7–18)
BUN/CREAT SERPL: 15 (ref 12–20)
CALCIUM SERPL-MCNC: 9.7 MG/DL (ref 8.5–10.1)
CHLORIDE SERPL-SCNC: 107 MMOL/L (ref 100–111)
CO2 SERPL-SCNC: 29 MMOL/L (ref 21–32)
CREAT SERPL-MCNC: 1.3 MG/DL (ref 0.6–1.3)
DIFFERENTIAL METHOD BLD: ABNORMAL
EOSINOPHIL # BLD: 0.4 K/UL (ref 0–0.4)
EOSINOPHIL NFR BLD: 7 % (ref 0–5)
ERYTHROCYTE [DISTWIDTH] IN BLOOD BY AUTOMATED COUNT: 13.9 % (ref 11.6–14.5)
FERRITIN SERPL-MCNC: 504 NG/ML (ref 8–388)
GLOBULIN SER CALC-MCNC: 2.9 G/DL (ref 2–4)
GLUCOSE SERPL-MCNC: 105 MG/DL (ref 74–99)
HCT VFR BLD AUTO: 37.2 % (ref 35–45)
HGB BLD-MCNC: 11.3 G/DL (ref 12–16)
IMM GRANULOCYTES # BLD AUTO: 0 K/UL (ref 0–0.04)
IMM GRANULOCYTES NFR BLD AUTO: 0 % (ref 0–0.5)
IRON SATN MFR SERPL: 39 % (ref 20–50)
IRON SERPL-MCNC: 107 UG/DL (ref 50–175)
LYMPHOCYTES # BLD: 2.2 K/UL (ref 0.9–3.6)
LYMPHOCYTES NFR BLD: 37 % (ref 21–52)
MCH RBC QN AUTO: 25.6 PG (ref 24–34)
MCHC RBC AUTO-ENTMCNC: 30.4 G/DL (ref 31–37)
MCV RBC AUTO: 84.2 FL (ref 78–100)
MONOCYTES # BLD: 0.6 K/UL (ref 0.05–1.2)
MONOCYTES NFR BLD: 10 % (ref 3–10)
NEUTS SEG # BLD: 2.7 K/UL (ref 1.8–8)
NEUTS SEG NFR BLD: 45 % (ref 40–73)
NRBC # BLD: 0 K/UL (ref 0–0.01)
NRBC BLD-RTO: 0 PER 100 WBC
PLATELET # BLD AUTO: 386 K/UL (ref 135–420)
PMV BLD AUTO: 9.4 FL (ref 9.2–11.8)
POTASSIUM SERPL-SCNC: 3.7 MMOL/L (ref 3.5–5.5)
PROT SERPL-MCNC: 7 G/DL (ref 6.4–8.2)
RBC # BLD AUTO: 4.42 M/UL (ref 4.2–5.3)
RETICS/RBC NFR AUTO: 1.3 % (ref 0.5–2.5)
SODIUM SERPL-SCNC: 140 MMOL/L (ref 136–145)
TIBC SERPL-MCNC: 273 UG/DL (ref 250–450)
WBC # BLD AUTO: 5.9 K/UL (ref 4.6–13.2)

## 2023-04-19 PROCEDURE — 83550 IRON BINDING TEST: CPT

## 2023-04-19 PROCEDURE — 85025 COMPLETE CBC W/AUTO DIFF WBC: CPT

## 2023-04-19 PROCEDURE — 80053 COMPREHEN METABOLIC PANEL: CPT

## 2023-04-19 PROCEDURE — 82728 ASSAY OF FERRITIN: CPT

## 2023-04-19 PROCEDURE — 36415 COLL VENOUS BLD VENIPUNCTURE: CPT

## 2023-04-19 PROCEDURE — 85045 AUTOMATED RETICULOCYTE COUNT: CPT

## 2023-04-19 PROCEDURE — 83540 ASSAY OF IRON: CPT

## 2023-04-24 ENCOUNTER — OFFICE VISIT (OUTPATIENT)
Age: 78
End: 2023-04-24
Payer: MEDICARE

## 2023-04-24 VITALS
DIASTOLIC BLOOD PRESSURE: 59 MMHG | WEIGHT: 193 LBS | RESPIRATION RATE: 18 BRPM | HEART RATE: 73 BPM | SYSTOLIC BLOOD PRESSURE: 143 MMHG | OXYGEN SATURATION: 98 % | BODY MASS INDEX: 32.15 KG/M2 | HEIGHT: 65 IN

## 2023-04-24 DIAGNOSIS — D50.8 OTHER IRON DEFICIENCY ANEMIAS: ICD-10-CM

## 2023-04-24 DIAGNOSIS — D75.839 THROMBOCYTOSIS, UNSPECIFIED: Primary | ICD-10-CM

## 2023-04-24 PROCEDURE — 1123F ACP DISCUSS/DSCN MKR DOCD: CPT | Performed by: INTERNAL MEDICINE

## 2023-04-24 PROCEDURE — 99212 OFFICE O/P EST SF 10 MIN: CPT | Performed by: INTERNAL MEDICINE

## 2023-04-24 PROCEDURE — 1036F TOBACCO NON-USER: CPT | Performed by: INTERNAL MEDICINE

## 2023-04-24 PROCEDURE — 99213 OFFICE O/P EST LOW 20 MIN: CPT | Performed by: INTERNAL MEDICINE

## 2023-04-24 PROCEDURE — 3017F COLORECTAL CA SCREEN DOC REV: CPT | Performed by: INTERNAL MEDICINE

## 2023-04-24 PROCEDURE — G8399 PT W/DXA RESULTS DOCUMENT: HCPCS | Performed by: INTERNAL MEDICINE

## 2023-04-24 PROCEDURE — 1090F PRES/ABSN URINE INCON ASSESS: CPT | Performed by: INTERNAL MEDICINE

## 2023-04-24 PROCEDURE — G8427 DOCREV CUR MEDS BY ELIG CLIN: HCPCS | Performed by: INTERNAL MEDICINE

## 2023-04-24 PROCEDURE — G8417 CALC BMI ABV UP PARAM F/U: HCPCS | Performed by: INTERNAL MEDICINE

## 2023-04-24 ASSESSMENT — ENCOUNTER SYMPTOMS
VOMITING: 0
ABDOMINAL PAIN: 0
COUGH: 0
SHORTNESS OF BREATH: 0
NAUSEA: 0
DIARRHEA: 0
BACK PAIN: 0

## 2023-04-24 NOTE — PROGRESS NOTES
Hematology/Oncology Note      Date:      Name: Mayank Grossman  : [id number]     Cassandra Boland MD      Subjective:     Chief complaint: Anemia     History of Present Illness:   Ms. Sheeba Harrison is a most pleasant 78y.o. year old female who was seen for reactive thrombocytosis and iron deficiency anemia. She was diagnosed in 2017. Today she reports doing well. The patient denied new complaints or concerns. Denied fever, chills, night sweat, unintentional weight loss, skin lumps or bumps, acute bleeding or bruising issues. Denied headache, acute vision change, dizziness, chest pain, worsen shortness of breath, palpitation, productive cough, nausea, vomiting, abdominal pain, altered bowel habits, dysuria, worsen bone pain or back pain, new focal numbness or weakness.        Past Medical History, Family History, and Social History:    Past Medical History:   Diagnosis Date    Anemia     Arthritis     Diabetes ([address]     Type 2    H/O seasonal allergies     High cholesterol     Hypertension      Past Surgical History:   Procedure Laterality Date    COLONOSCOPY N/A 2017    COLONOSCOPY performed by Sergey Blackburn MD at [facility] ENDOSCOPY    CYST INCISION AND DRAINAGE Right     \"approx 30 years ago\"    HYSTERECTOMY (CERVIX STATUS UNKNOWN)      \"approx 30 years ago\"     Social History     Tobacco Use    Smoking status: Never    Smokeless tobacco: Never   Substance Use Topics    Alcohol use: No    Drug use: No      Family History   Problem Relation Age of Onset    Hypertension Father     Osteoarthritis Father     Diabetes Sister     Kidney Disease Mother     Diabetes Sister     Diabetes Mother     Heart Disease Mother     No Known Problems Brother     Cancer Father         h/o prostate cancer     Current Outpatient Medications   Medication Sig Dispense Refill    atorvastatin (LIPITOR) 40 MG tablet Take 40 mg by mouth      ferrous sulfate (IRON 325) 325 (65 Fe) MG tablet Take by mouth every

## 2023-07-11 ENCOUNTER — HOSPITAL ENCOUNTER (OUTPATIENT)
Facility: HOSPITAL | Age: 78
Discharge: HOME | End: 2023-07-14
Payer: MEDICARE

## 2023-07-11 DIAGNOSIS — M25.551 BILATERAL HIP PAIN: ICD-10-CM

## 2023-07-11 DIAGNOSIS — M25.552 BILATERAL HIP PAIN: ICD-10-CM

## 2023-07-11 DIAGNOSIS — M25.562 CHRONIC PAIN OF LEFT KNEE: ICD-10-CM

## 2023-07-11 DIAGNOSIS — G89.29 CHRONIC PAIN OF LEFT KNEE: ICD-10-CM

## 2023-07-11 PROCEDURE — 73521 X-RAY EXAM HIPS BI 2 VIEWS: CPT

## 2023-07-11 PROCEDURE — 73562 X-RAY EXAM OF KNEE 3: CPT

## 2024-05-03 ENCOUNTER — TRANSCRIBE ORDERS (OUTPATIENT)
Facility: HOSPITAL | Age: 79
End: 2024-05-03

## 2024-05-03 ENCOUNTER — HOSPITAL ENCOUNTER (OUTPATIENT)
Facility: HOSPITAL | Age: 79
End: 2024-05-03
Payer: MEDICARE

## 2024-05-03 DIAGNOSIS — Z01.818 PRE-OP EXAM: ICD-10-CM

## 2024-05-03 DIAGNOSIS — Z01.818 PRE-OP EXAM: Primary | ICD-10-CM

## 2024-05-03 LAB
ANION GAP SERPL CALC-SCNC: 5 MMOL/L (ref 3–18)
BASOPHILS # BLD: 0.1 K/UL (ref 0–0.1)
BASOPHILS NFR BLD: 1 % (ref 0–2)
BUN SERPL-MCNC: 20 MG/DL (ref 7–18)
BUN/CREAT SERPL: 15 (ref 12–20)
CALCIUM SERPL-MCNC: 9.6 MG/DL (ref 8.5–10.1)
CHLORIDE SERPL-SCNC: 105 MMOL/L (ref 100–111)
CO2 SERPL-SCNC: 29 MMOL/L (ref 21–32)
CREAT SERPL-MCNC: 1.3 MG/DL (ref 0.6–1.3)
DIFFERENTIAL METHOD BLD: ABNORMAL
EOSINOPHIL # BLD: 0.3 K/UL (ref 0–0.4)
EOSINOPHIL NFR BLD: 6 % (ref 0–5)
ERYTHROCYTE [DISTWIDTH] IN BLOOD BY AUTOMATED COUNT: 13.9 % (ref 11.6–14.5)
GLUCOSE SERPL-MCNC: 102 MG/DL (ref 74–99)
HCT VFR BLD AUTO: 36.3 % (ref 35–45)
HGB BLD-MCNC: 11.1 G/DL (ref 12–16)
IMM GRANULOCYTES # BLD AUTO: 0 K/UL (ref 0–0.04)
IMM GRANULOCYTES NFR BLD AUTO: 0 % (ref 0–0.5)
LYMPHOCYTES # BLD: 1.9 K/UL (ref 0.9–3.6)
LYMPHOCYTES NFR BLD: 39 % (ref 21–52)
MCH RBC QN AUTO: 25.6 PG (ref 24–34)
MCHC RBC AUTO-ENTMCNC: 30.6 G/DL (ref 31–37)
MCV RBC AUTO: 83.6 FL (ref 78–100)
MONOCYTES # BLD: 0.5 K/UL (ref 0.05–1.2)
MONOCYTES NFR BLD: 9 % (ref 3–10)
NEUTS SEG # BLD: 2.2 K/UL (ref 1.8–8)
NEUTS SEG NFR BLD: 44 % (ref 40–73)
NRBC # BLD: 0 K/UL (ref 0–0.01)
NRBC BLD-RTO: 0 PER 100 WBC
PLATELET # BLD AUTO: 399 K/UL (ref 135–420)
PMV BLD AUTO: 9.3 FL (ref 9.2–11.8)
POTASSIUM SERPL-SCNC: 4 MMOL/L (ref 3.5–5.5)
RBC # BLD AUTO: 4.34 M/UL (ref 4.2–5.3)
SODIUM SERPL-SCNC: 139 MMOL/L (ref 136–145)
WBC # BLD AUTO: 4.9 K/UL (ref 4.6–13.2)

## 2024-05-03 PROCEDURE — 80048 BASIC METABOLIC PNL TOTAL CA: CPT

## 2024-05-03 PROCEDURE — 85025 COMPLETE CBC W/AUTO DIFF WBC: CPT

## 2024-05-03 PROCEDURE — 36415 COLL VENOUS BLD VENIPUNCTURE: CPT

## 2024-05-03 PROCEDURE — 93005 ELECTROCARDIOGRAM TRACING: CPT

## 2024-05-05 LAB
EKG ATRIAL RATE: 69 BPM
EKG DIAGNOSIS: NORMAL
EKG P AXIS: 55 DEGREES
EKG P-R INTERVAL: 182 MS
EKG Q-T INTERVAL: 408 MS
EKG QRS DURATION: 92 MS
EKG QTC CALCULATION (BAZETT): 437 MS
EKG R AXIS: -10 DEGREES
EKG T AXIS: 5 DEGREES
EKG VENTRICULAR RATE: 69 BPM

## 2024-05-05 PROCEDURE — 93010 ELECTROCARDIOGRAM REPORT: CPT | Performed by: INTERNAL MEDICINE

## 2024-05-22 ENCOUNTER — TRANSCRIBE ORDERS (OUTPATIENT)
Facility: HOSPITAL | Age: 79
End: 2024-05-22

## 2024-05-22 DIAGNOSIS — Z12.31 VISIT FOR SCREENING MAMMOGRAM: Primary | ICD-10-CM

## 2024-05-29 ENCOUNTER — OFFICE VISIT (OUTPATIENT)
Age: 79
End: 2024-05-29
Payer: MEDICARE

## 2024-05-29 VITALS
DIASTOLIC BLOOD PRESSURE: 55 MMHG | OXYGEN SATURATION: 99 % | WEIGHT: 186 LBS | SYSTOLIC BLOOD PRESSURE: 140 MMHG | HEART RATE: 65 BPM | BODY MASS INDEX: 30.95 KG/M2

## 2024-05-29 DIAGNOSIS — E11.9 TYPE 2 DIABETES MELLITUS WITHOUT COMPLICATION, WITHOUT LONG-TERM CURRENT USE OF INSULIN (HCC): ICD-10-CM

## 2024-05-29 DIAGNOSIS — Z01.810 PRE-OPERATIVE CARDIOVASCULAR EXAMINATION: ICD-10-CM

## 2024-05-29 DIAGNOSIS — R94.31 ABNORMAL ELECTROCARDIOGRAPHY: Primary | ICD-10-CM

## 2024-05-29 DIAGNOSIS — E78.49 OTHER HYPERLIPIDEMIA: ICD-10-CM

## 2024-05-29 DIAGNOSIS — I10 ESSENTIAL HYPERTENSION: ICD-10-CM

## 2024-05-29 DIAGNOSIS — R94.31 ABNORMAL EKG: ICD-10-CM

## 2024-05-29 PROCEDURE — G8427 DOCREV CUR MEDS BY ELIG CLIN: HCPCS | Performed by: INTERNAL MEDICINE

## 2024-05-29 PROCEDURE — 1036F TOBACCO NON-USER: CPT | Performed by: INTERNAL MEDICINE

## 2024-05-29 PROCEDURE — G8417 CALC BMI ABV UP PARAM F/U: HCPCS | Performed by: INTERNAL MEDICINE

## 2024-05-29 PROCEDURE — G8399 PT W/DXA RESULTS DOCUMENT: HCPCS | Performed by: INTERNAL MEDICINE

## 2024-05-29 PROCEDURE — 3078F DIAST BP <80 MM HG: CPT | Performed by: INTERNAL MEDICINE

## 2024-05-29 PROCEDURE — 1090F PRES/ABSN URINE INCON ASSESS: CPT | Performed by: INTERNAL MEDICINE

## 2024-05-29 PROCEDURE — 99204 OFFICE O/P NEW MOD 45 MIN: CPT | Performed by: INTERNAL MEDICINE

## 2024-05-29 PROCEDURE — 1123F ACP DISCUSS/DSCN MKR DOCD: CPT | Performed by: INTERNAL MEDICINE

## 2024-05-29 PROCEDURE — 3077F SYST BP >= 140 MM HG: CPT | Performed by: INTERNAL MEDICINE

## 2024-05-29 RX ORDER — INDAPAMIDE 2.5 MG/1
2.5 TABLET ORAL DAILY
COMMUNITY
Start: 2024-03-23

## 2024-05-29 RX ORDER — LATANOPROST 50 UG/ML
1 SOLUTION/ DROPS OPHTHALMIC NIGHTLY
COMMUNITY
Start: 2024-04-23

## 2024-05-29 ASSESSMENT — ENCOUNTER SYMPTOMS
GASTROINTESTINAL NEGATIVE: 1
RESPIRATORY NEGATIVE: 1
EYES NEGATIVE: 1

## 2024-05-29 NOTE — PROGRESS NOTES
Amita Garcia is a 78 y.o. year old female.    5/29/2024 seen as a new patient due to abnormal EKG showing possible old anterior MI.  Also has criteria for LVH without strain and left axis deviation.  Denies any chest pain dyspnea edema dizziness or palpitations.  She is due for bladder surgery for prolapse in 2 to 3 weeks.  Can go up 2 flights of steps without stopping and walk as much as she needs.  She stops because of knee pain.          Review of Systems   Constitutional: Negative.    HENT: Negative.     Eyes: Negative.    Respiratory: Negative.     Cardiovascular: Negative.    Gastrointestinal: Negative.    Endocrine: Negative.    Genitourinary: Negative.    Musculoskeletal: Negative.    Neurological: Negative.    Psychiatric/Behavioral: Negative.     All other systems reviewed and are negative.        Physical Exam  Vitals and nursing note reviewed.   Constitutional:       Appearance: Normal appearance.   HENT:      Head: Normocephalic and atraumatic.      Nose: Nose normal.   Eyes:      Conjunctiva/sclera: Conjunctivae normal.   Cardiovascular:      Rate and Rhythm: Normal rate and regular rhythm.      Pulses: Normal pulses.      Heart sounds: Murmur heard.      Harsh early systolic murmur is present with a grade of 2/6 at the upper right sternal border.   Pulmonary:      Effort: Pulmonary effort is normal.      Breath sounds: Normal breath sounds.   Abdominal:      General: Bowel sounds are normal.      Palpations: Abdomen is soft.   Musculoskeletal:         General: Normal range of motion.   Skin:     General: Skin is warm and dry.   Neurological:      General: No focal deficit present.      Mental Status: She is alert and oriented to person, place, and time.   Psychiatric:         Mood and Affect: Mood normal.         Behavior: Behavior normal.        Allergies   Allergen Reactions    Lisinopril Cough    Adhesive Tape Rash     Itching at site       Past Medical History:   Diagnosis Date

## 2024-05-30 ENCOUNTER — HOSPITAL ENCOUNTER (OUTPATIENT)
Facility: HOSPITAL | Age: 79
Discharge: HOME | End: 2024-06-01
Attending: INTERNAL MEDICINE
Payer: MEDICARE

## 2024-05-30 ENCOUNTER — HOSPITAL ENCOUNTER (OUTPATIENT)
Facility: HOSPITAL | Age: 79
Discharge: HOME | End: 2024-05-30
Attending: INTERNAL MEDICINE
Payer: MEDICARE

## 2024-05-30 VITALS
HEART RATE: 67 BPM | BODY MASS INDEX: 31.16 KG/M2 | WEIGHT: 187 LBS | HEIGHT: 65 IN | DIASTOLIC BLOOD PRESSURE: 76 MMHG | SYSTOLIC BLOOD PRESSURE: 154 MMHG

## 2024-05-30 DIAGNOSIS — R94.31 ABNORMAL ELECTROCARDIOGRAPHY: ICD-10-CM

## 2024-05-30 DIAGNOSIS — Z01.810 PRE-OPERATIVE CARDIOVASCULAR EXAMINATION: ICD-10-CM

## 2024-05-30 LAB
ECHO BSA: 1.97 M2
EKG DIAGNOSIS: NORMAL
NUC STRESS EJECTION FRACTION: 76 %
STRESS BASELINE DIAS BP: 76 MMHG
STRESS BASELINE HR: 68 BPM
STRESS BASELINE SYS BP: 154 MMHG
STRESS ESTIMATED WORKLOAD: 1 METS
STRESS PEAK DIAS BP: 76 MMHG
STRESS PEAK SYS BP: 164 MMHG
STRESS PERCENT HR ACHIEVED: 76 %
STRESS POST PEAK HR: 110 BPM
STRESS RATE PRESSURE PRODUCT: NORMAL BPM*MMHG
STRESS ST DEPRESSION: 0 MM
STRESS TARGET HR: 144 BPM
TID: 1

## 2024-05-30 PROCEDURE — 6360000002 HC RX W HCPCS: Performed by: INTERNAL MEDICINE

## 2024-05-30 PROCEDURE — A9502 TC99M TETROFOSMIN: HCPCS | Performed by: INTERNAL MEDICINE

## 2024-05-30 PROCEDURE — 2580000003 HC RX 258: Performed by: INTERNAL MEDICINE

## 2024-05-30 PROCEDURE — 3430000000 HC RX DIAGNOSTIC RADIOPHARMACEUTICAL: Performed by: INTERNAL MEDICINE

## 2024-05-30 PROCEDURE — 93017 CV STRESS TEST TRACING ONLY: CPT

## 2024-05-30 RX ORDER — 0.9 % SODIUM CHLORIDE 0.9 %
250 INTRAVENOUS SOLUTION INTRAVENOUS ONCE
Status: COMPLETED | OUTPATIENT
Start: 2024-05-30 | End: 2024-05-30

## 2024-05-30 RX ORDER — REGADENOSON 0.08 MG/ML
0.4 INJECTION, SOLUTION INTRAVENOUS
Status: COMPLETED | OUTPATIENT
Start: 2024-05-30 | End: 2024-05-30

## 2024-05-30 RX ADMIN — TETROFOSMIN 11 MILLICURIE: 1.38 INJECTION, POWDER, LYOPHILIZED, FOR SOLUTION INTRAVENOUS at 10:58

## 2024-05-30 RX ADMIN — SODIUM CHLORIDE 250 ML: 9 INJECTION, SOLUTION INTRAVENOUS at 10:59

## 2024-05-30 RX ADMIN — TETROFOSMIN 33 MILLICURIE: 1.38 INJECTION, POWDER, LYOPHILIZED, FOR SOLUTION INTRAVENOUS at 10:59

## 2024-05-30 RX ADMIN — REGADENOSON 0.4 MG: 0.4 INJECTION INTRAVENOUS at 10:59

## 2024-06-11 ENCOUNTER — HOSPITAL ENCOUNTER (OUTPATIENT)
Facility: HOSPITAL | Age: 79
Discharge: HOME | End: 2024-06-14
Payer: MEDICARE

## 2024-06-11 DIAGNOSIS — N13.30 HYDRONEPHROSIS, UNSPECIFIED HYDRONEPHROSIS TYPE: ICD-10-CM

## 2024-06-11 DIAGNOSIS — N81.11 CYSTOCELE, MIDLINE: ICD-10-CM

## 2024-06-11 DIAGNOSIS — N99.3 PROLAPSE OF VAGINAL VAULT AFTER HYSTERECTOMY: ICD-10-CM

## 2024-06-11 DIAGNOSIS — N95.2 POSTMENOPAUSAL ATROPHIC VAGINITIS: ICD-10-CM

## 2024-06-11 LAB
ABO + RH BLD: NORMAL
BLOOD GROUP ANTIBODIES SERPL: NORMAL
SPECIMEN EXP DATE BLD: NORMAL

## 2024-06-11 PROCEDURE — 86850 RBC ANTIBODY SCREEN: CPT

## 2024-06-11 PROCEDURE — 36415 COLL VENOUS BLD VENIPUNCTURE: CPT

## 2024-06-11 PROCEDURE — 86900 BLOOD TYPING SEROLOGIC ABO: CPT

## 2024-06-11 PROCEDURE — 86901 BLOOD TYPING SEROLOGIC RH(D): CPT

## 2024-07-08 ENCOUNTER — HOSPITAL ENCOUNTER (OUTPATIENT)
Facility: HOSPITAL | Age: 79
Discharge: HOME | End: 2024-07-11
Attending: FAMILY MEDICINE
Payer: MEDICARE

## 2024-07-08 DIAGNOSIS — Z12.31 VISIT FOR SCREENING MAMMOGRAM: ICD-10-CM

## 2024-07-08 PROCEDURE — 77063 BREAST TOMOSYNTHESIS BI: CPT

## 2024-09-18 ENCOUNTER — OFFICE VISIT (OUTPATIENT)
Age: 79
End: 2024-09-18
Payer: MEDICARE

## 2024-09-18 VITALS
SYSTOLIC BLOOD PRESSURE: 128 MMHG | HEIGHT: 65 IN | DIASTOLIC BLOOD PRESSURE: 57 MMHG | OXYGEN SATURATION: 99 % | HEART RATE: 80 BPM | WEIGHT: 183 LBS | BODY MASS INDEX: 30.49 KG/M2

## 2024-09-18 DIAGNOSIS — I10 ESSENTIAL HYPERTENSION: Primary | ICD-10-CM

## 2024-09-18 DIAGNOSIS — E11.9 TYPE 2 DIABETES MELLITUS WITHOUT COMPLICATION, WITHOUT LONG-TERM CURRENT USE OF INSULIN (HCC): ICD-10-CM

## 2024-09-18 DIAGNOSIS — E78.49 OTHER HYPERLIPIDEMIA: ICD-10-CM

## 2024-09-18 PROCEDURE — 1090F PRES/ABSN URINE INCON ASSESS: CPT | Performed by: INTERNAL MEDICINE

## 2024-09-18 PROCEDURE — G8399 PT W/DXA RESULTS DOCUMENT: HCPCS | Performed by: INTERNAL MEDICINE

## 2024-09-18 PROCEDURE — 1123F ACP DISCUSS/DSCN MKR DOCD: CPT | Performed by: INTERNAL MEDICINE

## 2024-09-18 PROCEDURE — 3078F DIAST BP <80 MM HG: CPT | Performed by: INTERNAL MEDICINE

## 2024-09-18 PROCEDURE — 3074F SYST BP LT 130 MM HG: CPT | Performed by: INTERNAL MEDICINE

## 2024-09-18 PROCEDURE — 1036F TOBACCO NON-USER: CPT | Performed by: INTERNAL MEDICINE

## 2024-09-18 PROCEDURE — G8427 DOCREV CUR MEDS BY ELIG CLIN: HCPCS | Performed by: INTERNAL MEDICINE

## 2024-09-18 PROCEDURE — 99213 OFFICE O/P EST LOW 20 MIN: CPT | Performed by: INTERNAL MEDICINE

## 2024-09-18 PROCEDURE — G8417 CALC BMI ABV UP PARAM F/U: HCPCS | Performed by: INTERNAL MEDICINE

## 2024-09-18 ASSESSMENT — ENCOUNTER SYMPTOMS
EYES NEGATIVE: 1
RESPIRATORY NEGATIVE: 1
GASTROINTESTINAL NEGATIVE: 1

## 2025-06-24 ENCOUNTER — TRANSCRIBE ORDERS (OUTPATIENT)
Facility: HOSPITAL | Age: 80
End: 2025-06-24

## 2025-06-24 DIAGNOSIS — Z12.31 VISIT FOR SCREENING MAMMOGRAM: Primary | ICD-10-CM

## 2025-07-09 ENCOUNTER — HOSPITAL ENCOUNTER (OUTPATIENT)
Facility: HOSPITAL | Age: 80
Discharge: HOME | End: 2025-07-12
Attending: FAMILY MEDICINE
Payer: MEDICARE

## 2025-07-09 VITALS — WEIGHT: 186 LBS | HEIGHT: 65 IN | BODY MASS INDEX: 30.99 KG/M2

## 2025-07-09 DIAGNOSIS — Z12.31 VISIT FOR SCREENING MAMMOGRAM: ICD-10-CM

## 2025-07-09 PROCEDURE — 77063 BREAST TOMOSYNTHESIS BI: CPT

## (undated) DEVICE — CATH IV SAFE STR 22GX1IN BLU -- PROTECTIV PLUS

## (undated) DEVICE — Device